# Patient Record
Sex: FEMALE | Race: WHITE | NOT HISPANIC OR LATINO | URBAN - METROPOLITAN AREA
[De-identification: names, ages, dates, MRNs, and addresses within clinical notes are randomized per-mention and may not be internally consistent; named-entity substitution may affect disease eponyms.]

---

## 2017-01-14 ENCOUNTER — OUTPATIENT (OUTPATIENT)
Dept: OUTPATIENT SERVICES | Facility: HOSPITAL | Age: 62
LOS: 1 days | Discharge: HOME | End: 2017-01-14

## 2017-05-27 ENCOUNTER — OUTPATIENT (OUTPATIENT)
Dept: OUTPATIENT SERVICES | Facility: HOSPITAL | Age: 62
LOS: 1 days | Discharge: HOME | End: 2017-05-27

## 2017-06-28 DIAGNOSIS — N18.2 CHRONIC KIDNEY DISEASE, STAGE 2 (MILD): ICD-10-CM

## 2017-06-28 DIAGNOSIS — E11.9 TYPE 2 DIABETES MELLITUS WITHOUT COMPLICATIONS: ICD-10-CM

## 2017-06-28 DIAGNOSIS — E78.00 PURE HYPERCHOLESTEROLEMIA, UNSPECIFIED: ICD-10-CM

## 2017-06-28 DIAGNOSIS — M10.9 GOUT, UNSPECIFIED: ICD-10-CM

## 2017-06-28 DIAGNOSIS — E03.9 HYPOTHYROIDISM, UNSPECIFIED: ICD-10-CM

## 2017-06-28 DIAGNOSIS — E11.39 TYPE 2 DIABETES MELLITUS WITH OTHER DIABETIC OPHTHALMIC COMPLICATION: ICD-10-CM

## 2017-09-02 ENCOUNTER — OUTPATIENT (OUTPATIENT)
Dept: OUTPATIENT SERVICES | Facility: HOSPITAL | Age: 62
LOS: 1 days | Discharge: HOME | End: 2017-09-02

## 2017-09-02 DIAGNOSIS — E78.00 PURE HYPERCHOLESTEROLEMIA, UNSPECIFIED: ICD-10-CM

## 2017-09-02 DIAGNOSIS — N18.2 CHRONIC KIDNEY DISEASE, STAGE 2 (MILD): ICD-10-CM

## 2017-09-02 DIAGNOSIS — E03.9 HYPOTHYROIDISM, UNSPECIFIED: ICD-10-CM

## 2017-09-02 DIAGNOSIS — Z00.00 ENCOUNTER FOR GENERAL ADULT MEDICAL EXAMINATION WITHOUT ABNORMAL FINDINGS: ICD-10-CM

## 2017-09-02 DIAGNOSIS — F17.200 NICOTINE DEPENDENCE, UNSPECIFIED, UNCOMPLICATED: ICD-10-CM

## 2017-09-02 DIAGNOSIS — M10.9 GOUT, UNSPECIFIED: ICD-10-CM

## 2017-09-02 DIAGNOSIS — E11.9 TYPE 2 DIABETES MELLITUS WITHOUT COMPLICATIONS: ICD-10-CM

## 2017-12-09 ENCOUNTER — OUTPATIENT (OUTPATIENT)
Dept: OUTPATIENT SERVICES | Facility: HOSPITAL | Age: 62
LOS: 1 days | Discharge: HOME | End: 2017-12-09

## 2017-12-09 DIAGNOSIS — E11.9 TYPE 2 DIABETES MELLITUS WITHOUT COMPLICATIONS: ICD-10-CM

## 2018-03-03 ENCOUNTER — OUTPATIENT (OUTPATIENT)
Dept: OUTPATIENT SERVICES | Facility: HOSPITAL | Age: 63
LOS: 1 days | Discharge: HOME | End: 2018-03-03

## 2018-03-03 DIAGNOSIS — N18.2 CHRONIC KIDNEY DISEASE, STAGE 2 (MILD): ICD-10-CM

## 2018-03-03 DIAGNOSIS — E78.00 PURE HYPERCHOLESTEROLEMIA, UNSPECIFIED: ICD-10-CM

## 2018-03-03 DIAGNOSIS — E03.9 HYPOTHYROIDISM, UNSPECIFIED: ICD-10-CM

## 2018-03-03 DIAGNOSIS — I10 ESSENTIAL (PRIMARY) HYPERTENSION: ICD-10-CM

## 2018-03-03 DIAGNOSIS — M10.9 GOUT, UNSPECIFIED: ICD-10-CM

## 2018-03-03 DIAGNOSIS — E11.9 TYPE 2 DIABETES MELLITUS WITHOUT COMPLICATIONS: ICD-10-CM

## 2018-06-26 ENCOUNTER — OUTPATIENT (OUTPATIENT)
Dept: OUTPATIENT SERVICES | Facility: HOSPITAL | Age: 63
LOS: 1 days | Discharge: HOME | End: 2018-06-26

## 2018-06-26 DIAGNOSIS — N39.0 URINARY TRACT INFECTION, SITE NOT SPECIFIED: ICD-10-CM

## 2020-06-16 ENCOUNTER — INPATIENT (INPATIENT)
Facility: HOSPITAL | Age: 65
LOS: 0 days | Discharge: LEFT BEFORE DISCHARGE | End: 2020-06-16
Attending: INTERNAL MEDICINE | Admitting: INTERNAL MEDICINE
Payer: MEDICARE

## 2020-06-16 VITALS
HEART RATE: 63 BPM | TEMPERATURE: 96 F | DIASTOLIC BLOOD PRESSURE: 71 MMHG | SYSTOLIC BLOOD PRESSURE: 154 MMHG | RESPIRATION RATE: 16 BRPM

## 2020-06-16 VITALS
SYSTOLIC BLOOD PRESSURE: 149 MMHG | DIASTOLIC BLOOD PRESSURE: 65 MMHG | OXYGEN SATURATION: 91 % | RESPIRATION RATE: 18 BRPM | HEART RATE: 64 BPM | WEIGHT: 173.94 LBS

## 2020-06-16 DIAGNOSIS — Z71.6 TOBACCO ABUSE COUNSELING: ICD-10-CM

## 2020-06-16 DIAGNOSIS — E11.649 TYPE 2 DIABETES MELLITUS WITH HYPOGLYCEMIA WITHOUT COMA: ICD-10-CM

## 2020-06-16 DIAGNOSIS — D64.9 ANEMIA, UNSPECIFIED: ICD-10-CM

## 2020-06-16 DIAGNOSIS — J44.9 CHRONIC OBSTRUCTIVE PULMONARY DISEASE, UNSPECIFIED: ICD-10-CM

## 2020-06-16 DIAGNOSIS — Z79.899 OTHER LONG TERM (CURRENT) DRUG THERAPY: ICD-10-CM

## 2020-06-16 DIAGNOSIS — I50.9 HEART FAILURE, UNSPECIFIED: ICD-10-CM

## 2020-06-16 DIAGNOSIS — N17.9 ACUTE KIDNEY FAILURE, UNSPECIFIED: ICD-10-CM

## 2020-06-16 DIAGNOSIS — Z90.49 ACQUIRED ABSENCE OF OTHER SPECIFIED PARTS OF DIGESTIVE TRACT: Chronic | ICD-10-CM

## 2020-06-16 DIAGNOSIS — I10 ESSENTIAL (PRIMARY) HYPERTENSION: ICD-10-CM

## 2020-06-16 LAB
ALBUMIN SERPL ELPH-MCNC: 3.6 G/DL — SIGNIFICANT CHANGE UP (ref 3.5–5.2)
ALBUMIN SERPL ELPH-MCNC: 3.8 G/DL — SIGNIFICANT CHANGE UP (ref 3.5–5.2)
ALP SERPL-CCNC: 142 U/L — HIGH (ref 30–115)
ALP SERPL-CCNC: 142 U/L — HIGH (ref 30–115)
ALT FLD-CCNC: 11 U/L — SIGNIFICANT CHANGE UP (ref 0–41)
ALT FLD-CCNC: 8 U/L — SIGNIFICANT CHANGE UP (ref 0–41)
ANION GAP SERPL CALC-SCNC: 12 MMOL/L — SIGNIFICANT CHANGE UP (ref 7–14)
ANION GAP SERPL CALC-SCNC: 8 MMOL/L — SIGNIFICANT CHANGE UP (ref 7–14)
APPEARANCE UR: CLEAR — SIGNIFICANT CHANGE UP
AST SERPL-CCNC: 17 U/L — SIGNIFICANT CHANGE UP (ref 0–41)
AST SERPL-CCNC: 18 U/L — SIGNIFICANT CHANGE UP (ref 0–41)
BACTERIA # UR AUTO: ABNORMAL
BASE EXCESS BLDV CALC-SCNC: -9.6 MMOL/L — LOW (ref -2–2)
BILIRUB SERPL-MCNC: 0.2 MG/DL — SIGNIFICANT CHANGE UP (ref 0.2–1.2)
BILIRUB SERPL-MCNC: <0.2 MG/DL — SIGNIFICANT CHANGE UP (ref 0.2–1.2)
BILIRUB UR-MCNC: NEGATIVE — SIGNIFICANT CHANGE UP
BUN SERPL-MCNC: 80 MG/DL — CRITICAL HIGH (ref 10–20)
BUN SERPL-MCNC: 84 MG/DL — CRITICAL HIGH (ref 10–20)
CA-I SERPL-SCNC: 1.23 MMOL/L — SIGNIFICANT CHANGE UP (ref 1.12–1.3)
CALCIUM SERPL-MCNC: 8.6 MG/DL — SIGNIFICANT CHANGE UP (ref 8.5–10.1)
CALCIUM SERPL-MCNC: 8.6 MG/DL — SIGNIFICANT CHANGE UP (ref 8.5–10.1)
CHLORIDE SERPL-SCNC: 102 MMOL/L — SIGNIFICANT CHANGE UP (ref 98–110)
CHLORIDE SERPL-SCNC: 107 MMOL/L — SIGNIFICANT CHANGE UP (ref 98–110)
CK MB CFR SERPL CALC: 6.8 NG/ML — HIGH (ref 0.6–6.3)
CK MB CFR SERPL CALC: 6.9 NG/ML — HIGH (ref 0.6–6.3)
CK SERPL-CCNC: 64 U/L — SIGNIFICANT CHANGE UP (ref 0–225)
CK SERPL-CCNC: 69 U/L — SIGNIFICANT CHANGE UP (ref 0–225)
CO2 SERPL-SCNC: 19 MMOL/L — SIGNIFICANT CHANGE UP (ref 17–32)
CO2 SERPL-SCNC: 19 MMOL/L — SIGNIFICANT CHANGE UP (ref 17–32)
COLOR SPEC: YELLOW — SIGNIFICANT CHANGE UP
CREAT SERPL-MCNC: 2.8 MG/DL — HIGH (ref 0.7–1.5)
CREAT SERPL-MCNC: 2.8 MG/DL — HIGH (ref 0.7–1.5)
DIFF PNL FLD: NEGATIVE — SIGNIFICANT CHANGE UP
EPI CELLS # UR: ABNORMAL /HPF
GAS PNL BLDV: 140 MMOL/L — SIGNIFICANT CHANGE UP (ref 136–145)
GAS PNL BLDV: SIGNIFICANT CHANGE UP
GLUCOSE BLDC GLUCOMTR-MCNC: 103 MG/DL — HIGH (ref 70–99)
GLUCOSE BLDC GLUCOMTR-MCNC: 108 MG/DL — HIGH (ref 70–99)
GLUCOSE BLDC GLUCOMTR-MCNC: 134 MG/DL — HIGH (ref 70–99)
GLUCOSE SERPL-MCNC: 104 MG/DL — HIGH (ref 70–99)
GLUCOSE SERPL-MCNC: 30 MG/DL — CRITICAL LOW (ref 70–99)
GLUCOSE UR QL: NEGATIVE MG/DL — SIGNIFICANT CHANGE UP
HCO3 BLDV-SCNC: 19 MMOL/L — LOW (ref 22–29)
HCT VFR BLD CALC: 31.4 % — LOW (ref 37–47)
HCT VFR BLD CALC: 34.1 % — LOW (ref 37–47)
HCT VFR BLDA CALC: 30.5 % — LOW (ref 34–44)
HGB BLD CALC-MCNC: 10 G/DL — LOW (ref 14–18)
HGB BLD-MCNC: 10.1 G/DL — LOW (ref 12–16)
HGB BLD-MCNC: 9.4 G/DL — LOW (ref 12–16)
HOROWITZ INDEX BLDV+IHG-RTO: 21 — SIGNIFICANT CHANGE UP
KETONES UR-MCNC: NEGATIVE — SIGNIFICANT CHANGE UP
LACTATE BLDV-MCNC: 0.8 MMOL/L — SIGNIFICANT CHANGE UP (ref 0.5–1.6)
LEUKOCYTE ESTERASE UR-ACNC: ABNORMAL
MAGNESIUM SERPL-MCNC: 2.5 MG/DL — HIGH (ref 1.8–2.4)
MCHC RBC-ENTMCNC: 29.6 G/DL — LOW (ref 32–37)
MCHC RBC-ENTMCNC: 29.9 G/DL — LOW (ref 32–37)
MCHC RBC-ENTMCNC: 30 PG — SIGNIFICANT CHANGE UP (ref 27–31)
MCHC RBC-ENTMCNC: 30.4 PG — SIGNIFICANT CHANGE UP (ref 27–31)
MCV RBC AUTO: 100.3 FL — HIGH (ref 81–99)
MCV RBC AUTO: 102.7 FL — HIGH (ref 81–99)
NITRITE UR-MCNC: NEGATIVE — SIGNIFICANT CHANGE UP
NRBC # BLD: 0 /100 WBCS — SIGNIFICANT CHANGE UP (ref 0–0)
NRBC # BLD: 0 /100 WBCS — SIGNIFICANT CHANGE UP (ref 0–0)
NT-PROBNP SERPL-SCNC: 4550 PG/ML — HIGH (ref 0–300)
PCO2 BLDV: 55 MMHG — HIGH (ref 41–51)
PH BLDV: 7.15 — LOW (ref 7.26–7.43)
PH UR: 5.5 — SIGNIFICANT CHANGE UP (ref 5–8)
PHOSPHATE SERPL-MCNC: 8.5 MG/DL — HIGH (ref 2.1–4.9)
PLATELET # BLD AUTO: 100 K/UL — LOW (ref 130–400)
PLATELET # BLD AUTO: 95 K/UL — LOW (ref 130–400)
PO2 BLDV: 59 MMHG — HIGH (ref 20–40)
POTASSIUM BLDV-SCNC: 4.2 MMOL/L — SIGNIFICANT CHANGE UP (ref 3.3–5.6)
POTASSIUM SERPL-MCNC: 4.4 MMOL/L — SIGNIFICANT CHANGE UP (ref 3.5–5)
POTASSIUM SERPL-MCNC: 5.6 MMOL/L — HIGH (ref 3.5–5)
POTASSIUM SERPL-SCNC: 4.4 MMOL/L — SIGNIFICANT CHANGE UP (ref 3.5–5)
POTASSIUM SERPL-SCNC: 5.6 MMOL/L — HIGH (ref 3.5–5)
PROT SERPL-MCNC: 6.6 G/DL — SIGNIFICANT CHANGE UP (ref 6–8)
PROT SERPL-MCNC: 6.6 G/DL — SIGNIFICANT CHANGE UP (ref 6–8)
PROT UR-MCNC: 30 MG/DL
RBC # BLD: 3.13 M/UL — LOW (ref 4.2–5.4)
RBC # BLD: 3.32 M/UL — LOW (ref 4.2–5.4)
RBC # FLD: 16.7 % — HIGH (ref 11.5–14.5)
RBC # FLD: 16.8 % — HIGH (ref 11.5–14.5)
SAO2 % BLDV: 90 % — SIGNIFICANT CHANGE UP
SARS-COV-2 RNA SPEC QL NAA+PROBE: SIGNIFICANT CHANGE UP
SODIUM SERPL-SCNC: 133 MMOL/L — LOW (ref 135–146)
SODIUM SERPL-SCNC: 134 MMOL/L — LOW (ref 135–146)
SP GR SPEC: 1.02 — SIGNIFICANT CHANGE UP (ref 1.01–1.03)
TROPONIN T SERPL-MCNC: 0.01 NG/ML — SIGNIFICANT CHANGE UP
TROPONIN T SERPL-MCNC: 0.01 NG/ML — SIGNIFICANT CHANGE UP
TROPONIN T SERPL-MCNC: 0.02 NG/ML — HIGH
UROBILINOGEN FLD QL: 0.2 MG/DL — SIGNIFICANT CHANGE UP (ref 0.2–0.2)
WBC # BLD: 7.48 K/UL — SIGNIFICANT CHANGE UP (ref 4.8–10.8)
WBC # BLD: 9.24 K/UL — SIGNIFICANT CHANGE UP (ref 4.8–10.8)
WBC # FLD AUTO: 7.48 K/UL — SIGNIFICANT CHANGE UP (ref 4.8–10.8)
WBC # FLD AUTO: 9.24 K/UL — SIGNIFICANT CHANGE UP (ref 4.8–10.8)
WBC UR QL: SIGNIFICANT CHANGE UP /HPF

## 2020-06-16 PROCEDURE — 99223 1ST HOSP IP/OBS HIGH 75: CPT

## 2020-06-16 PROCEDURE — 99291 CRITICAL CARE FIRST HOUR: CPT

## 2020-06-16 PROCEDURE — 76770 US EXAM ABDO BACK WALL COMP: CPT | Mod: 26

## 2020-06-16 PROCEDURE — 99053 MED SERV 10PM-8AM 24 HR FAC: CPT

## 2020-06-16 PROCEDURE — 71045 X-RAY EXAM CHEST 1 VIEW: CPT | Mod: 26

## 2020-06-16 RX ORDER — DEXTROSE 50 % IN WATER 50 %
25 SYRINGE (ML) INTRAVENOUS ONCE
Refills: 0 | Status: DISCONTINUED | OUTPATIENT
Start: 2020-06-16 | End: 2020-06-16

## 2020-06-16 RX ORDER — BUDESONIDE AND FORMOTEROL FUMARATE DIHYDRATE 160; 4.5 UG/1; UG/1
2 AEROSOL RESPIRATORY (INHALATION)
Refills: 0 | Status: DISCONTINUED | OUTPATIENT
Start: 2020-06-16 | End: 2020-06-16

## 2020-06-16 RX ORDER — FUROSEMIDE 40 MG
40 TABLET ORAL DAILY
Refills: 0 | Status: DISCONTINUED | OUTPATIENT
Start: 2020-06-16 | End: 2020-06-16

## 2020-06-16 RX ORDER — ATORVASTATIN CALCIUM 80 MG/1
10 TABLET, FILM COATED ORAL AT BEDTIME
Refills: 0 | Status: DISCONTINUED | OUTPATIENT
Start: 2020-06-16 | End: 2020-06-16

## 2020-06-16 RX ORDER — FUROSEMIDE 40 MG
40 TABLET ORAL ONCE
Refills: 0 | Status: COMPLETED | OUTPATIENT
Start: 2020-06-16 | End: 2020-06-16

## 2020-06-16 RX ORDER — GLUCAGON INJECTION, SOLUTION 0.5 MG/.1ML
1 INJECTION, SOLUTION SUBCUTANEOUS ONCE
Refills: 0 | Status: DISCONTINUED | OUTPATIENT
Start: 2020-06-16 | End: 2020-06-16

## 2020-06-16 RX ORDER — NICOTINE POLACRILEX 2 MG
1 GUM BUCCAL DAILY
Refills: 0 | Status: DISCONTINUED | OUTPATIENT
Start: 2020-06-16 | End: 2020-06-16

## 2020-06-16 RX ORDER — INSULIN LISPRO 100/ML
VIAL (ML) SUBCUTANEOUS AT BEDTIME
Refills: 0 | Status: DISCONTINUED | OUTPATIENT
Start: 2020-06-16 | End: 2020-06-16

## 2020-06-16 RX ORDER — FENOFIBRATE,MICRONIZED 130 MG
145 CAPSULE ORAL AT BEDTIME
Refills: 0 | Status: DISCONTINUED | OUTPATIENT
Start: 2020-06-16 | End: 2020-06-16

## 2020-06-16 RX ORDER — DEXTROSE 50 % IN WATER 50 %
12.5 SYRINGE (ML) INTRAVENOUS ONCE
Refills: 0 | Status: DISCONTINUED | OUTPATIENT
Start: 2020-06-16 | End: 2020-06-16

## 2020-06-16 RX ORDER — INSULIN LISPRO 100/ML
VIAL (ML) SUBCUTANEOUS
Refills: 0 | Status: DISCONTINUED | OUTPATIENT
Start: 2020-06-16 | End: 2020-06-16

## 2020-06-16 RX ORDER — DEXTROSE 50 % IN WATER 50 %
50 SYRINGE (ML) INTRAVENOUS ONCE
Refills: 0 | Status: COMPLETED | OUTPATIENT
Start: 2020-06-16 | End: 2020-06-16

## 2020-06-16 RX ORDER — IPRATROPIUM/ALBUTEROL SULFATE 18-103MCG
3 AEROSOL WITH ADAPTER (GRAM) INHALATION EVERY 6 HOURS
Refills: 0 | Status: DISCONTINUED | OUTPATIENT
Start: 2020-06-16 | End: 2020-06-16

## 2020-06-16 RX ORDER — SODIUM CHLORIDE 9 MG/ML
1000 INJECTION, SOLUTION INTRAVENOUS
Refills: 0 | Status: DISCONTINUED | OUTPATIENT
Start: 2020-06-16 | End: 2020-06-16

## 2020-06-16 RX ORDER — HEPARIN SODIUM 5000 [USP'U]/ML
5000 INJECTION INTRAVENOUS; SUBCUTANEOUS
Refills: 0 | Status: DISCONTINUED | OUTPATIENT
Start: 2020-06-16 | End: 2020-06-16

## 2020-06-16 RX ORDER — FUROSEMIDE 40 MG
20 TABLET ORAL DAILY
Refills: 0 | Status: DISCONTINUED | OUTPATIENT
Start: 2020-06-16 | End: 2020-06-16

## 2020-06-16 RX ORDER — DEXTROSE 50 % IN WATER 50 %
15 SYRINGE (ML) INTRAVENOUS ONCE
Refills: 0 | Status: DISCONTINUED | OUTPATIENT
Start: 2020-06-16 | End: 2020-06-16

## 2020-06-16 RX ORDER — INSULIN GLARGINE 100 [IU]/ML
40 INJECTION, SOLUTION SUBCUTANEOUS
Qty: 0 | Refills: 0 | DISCHARGE

## 2020-06-16 RX ADMIN — Medication 60 MILLIGRAM(S): at 15:39

## 2020-06-16 RX ADMIN — Medication 40 MILLIGRAM(S): at 12:10

## 2020-06-16 RX ADMIN — HEPARIN SODIUM 5000 UNIT(S): 5000 INJECTION INTRAVENOUS; SUBCUTANEOUS at 09:52

## 2020-06-16 RX ADMIN — Medication 50 MILLILITER(S): at 03:41

## 2020-06-16 RX ADMIN — HEPARIN SODIUM 5000 UNIT(S): 5000 INJECTION INTRAVENOUS; SUBCUTANEOUS at 18:09

## 2020-06-16 RX ADMIN — Medication 40 MILLIGRAM(S): at 04:30

## 2020-06-16 NOTE — CONSULT NOTE ADULT - ATTENDING COMMENTS
patient seen and examined agree above note   prednisone 60 mg for 3 days then 40 for 3 days then 20 for 3 days   symbicort Q 12 hrs   levaquine for 7 days   need follow up as outpatient to rpeat cxr to confirm resolution of left opacity   need ct scan outpatient   patient was informed

## 2020-06-16 NOTE — CHART NOTE - NSCHARTNOTEFT_GEN_A_CORE
Nurse called informing patient wants to leave the hospital    Patient seen and examined earlier. Appreciate input from critical care and pulmonology. I evaluated patient again. Patient states she is feeling better. She has plans to move to [place] tomorrow and has to leave.  She understands possibility of worsening condition of her heart, lungs and kidneys and complication including death can happen. Even then patient wants to leave the hospital adamently. nurse witnessed the conversation.    Patient is leaving AMA

## 2020-06-16 NOTE — H&P ADULT - NSICDXPASTMEDICALHX_GEN_ALL_CORE_FT
PAST MEDICAL HISTORY:  Chronic obstructive pulmonary disease     Diabetes mellitus, type 2     High blood cholesterol     History of cholecystectomy     Hypertension

## 2020-06-16 NOTE — ED PROVIDER NOTE - NS ED ROS FT
Constitutional: (-) fever  Eyes/ENT: (-) blurry vision, (-) epistaxis  Cardiovascular: (-) chest pain, (-) syncope  Respiratory: (+) cough, (+) shortness of breath (see HPI)  Gastrointestinal: (-) vomiting, (+) diarrhea (see HPI)  Musculoskeletal: (-) neck pain, (-) back pain, (-) joint pain  Integumentary: (-) rash, (+) edema (see HPI)  Neurological: (-) headache, (-) altered mental status  Psychiatric: (-) hallucinations  Allergic/Immunologic: (-) pruritus

## 2020-06-16 NOTE — ED PROVIDER NOTE - ATTENDING CONTRIBUTION TO CARE
2-3 weeks of MCKENNA, orthopnea, leg swelling associated weakness that worsened today. Takes insulin and took it today at 6-7pm with nml meal. initially found to be hypoglycemic. on exam mild tacphynea, diaphoretic, with crackles at bases, pitting edema. We gave orange juice and dextrose initially, bed side us showed nml rv and grossly nml EF but B lines diffusely. Patient resuscitated with dextrose. imp: heart failure with hypoglycemia. will check labs, perform imaging.

## 2020-06-16 NOTE — ED PROCEDURE NOTE - US CPT CODES
97447 US Chest (PTX, Pleural Effussion/CHF vs COPD)
16621 Echocardiography Transthoracic with Image 2D (Echo/FAST)

## 2020-06-16 NOTE — CONSULT NOTE ADULT - ASSESSMENT
IMPRESSION:  Acute hypercapnic respiratory failure  COPD with exacerbation  Component of CHF  NAGMA - MANISH?    RECOMMENDATIONS:  Albuterol q4 and PRN  Start triple therapy  Prednisone 40mg q24  Levaquin renal dose  Check d dimer  LE duplex  DVT ppx

## 2020-06-16 NOTE — ED ADULT NURSE NOTE - PMH
Chronic obstructive pulmonary disease    Diabetes mellitus, type 2    High blood cholesterol    History of cholecystectomy    Hypertension

## 2020-06-16 NOTE — H&P ADULT - PROBLEM SELECTOR PLAN 7
Pateint doesn't know doses, would review with patient's usual clinicians when possible Patient doesn't know doses, would review with patient's usual clinicians when possible. Also clarify use of Beta blocker

## 2020-06-16 NOTE — DISCHARGE NOTE NURSING/CASE MANAGEMENT/SOCIAL WORK - PATIENT PORTAL LINK FT
You can access the FollowMyHealth Patient Portal offered by [facility] by registering at the following website: [email or web address]. By joining [employer]’s FollowMyHealth portal, you will also be able to view your health information using other applications (apps) compatible with our system.

## 2020-06-16 NOTE — ED PROVIDER NOTE - CARE PLAN
Principal Discharge DX:	Acute congestive heart failure  Secondary Diagnosis:	Hypoglycemia associated with type 2 diabetes mellitus  Secondary Diagnosis:	Acute kidney injury

## 2020-06-16 NOTE — H&P ADULT - NSHPOUTPATIENTPROVIDERS_GEN_ALL_CORE
cardiology- Dr Kelley, PMD- Dr Cooper cardiology- Dr Kelley, PMD- Dr Cooper (does not see pulmonary or nephrology)

## 2020-06-16 NOTE — ED PROVIDER NOTE - PROGRESS NOTE DETAILS
65 female, weakness and SOB x3 weeks, progressively worsening. Initial examination pt cool, pale and diaphoretic. BGL found to be 37 g/dl. Pt given (1) amp D50, pt more alert and skin appearance unremarkable. Pt also found to have initial RA SpO2 91%, placed on NC at 3 LPM, SpO2 improved to 97% Bedside US reveals B lines bilaterally, no pleural effusion noted. Cardiac reveals normal LV fxn, no Rt heart strain and pericardial effusion. Rectal temp 94.7, pt to get additional work up for sepsis. Will revaluate for abx therapy once further lab results available. ICU consulted given MANISH with metabolic and respiratory acidosis with likely mild compensation with breathing. Will give lasix given that patient is total volume overloaded. Will hold off on fluids. repeat blood sugar is nml.

## 2020-06-16 NOTE — CONSULT NOTE ADULT - SUBJECTIVE AND OBJECTIVE BOX
Patient is a 65y old  Female who presents with a chief complaint of     HPI:  64yo female whose PMH includes HTN, HLD, COPD (active smoker, does not have a pulmonologist), DM type 2 on insulin and an unspecified kidney disorder (managed by her PMD, never saw a nephrologist) and (to me) denies any history of CHF, presents to the ER with worsening shortness of breath and weakness over course of 3 weeks. There appears to be orthopnea and PND but patient denies fevers, chills or chest pain. She does have a chronic problem with diarrhea. Last ECHO and stress test was 5 years ago. Tonight she awoke tired and sweaty. On arrival to ER significant findings included a glucose of 30 (given D50 with symptom improvement), bradycardia and apparent acute CHF (clinically, BNP result, on bedside sonogram and on CXR) (2020 05:41)      PAST MEDICAL & SURGICAL HISTORY:  Hypertension  History of cholecystectomy  High blood cholesterol  Diabetes mellitus, type 2  Chronic obstructive pulmonary disease  History of cholecystectomy      SOCIAL HX:   Active smoker    FAMILY HISTORY:  .  No cardiovascular or pulmonary family history     REVIEW OF SYSTEMS:    CONSTITUTIONAL:   +chills  + weight gain   no weight loss    EYES:   no discharge,   no visual changes.    ENT:   Ears: no ear pain and no hearing problems.  Nose: no nasal congestion and no nasal drainage.  Mouth/Throat: no dysphagia,  no hoarseness and no throat pain.  Neck: no lumps, no pain, no stiffness and no swollen glands.    CARDIOVASCULAR:   no chest pain,   no swelling  no palpitaions  no syncope    RESPIRATORY:  SOB, wheezing,  dyspnea on exertion  Clear sputum production    GASTROINTESTINAL:   no abdominal pain,   no constipation,   no diarrhea,   no vomiting.    GENITOURINARY:  no dysuria,   no hematuria.    MUSCULOSKELETAL:   no back pain,   no musculoskeletal pain,  no weakness.    SKIN:   no jaundice,    no rashes.    NEURO:   no loss of consciousness,   no headache,   no weakness.    PSYCHIATRIC:   no known mental health issues  no anxiety  no depression    ALLERGIC/IMMUNOLOGIC:   No active allergic or immunologic issues      Allergies    No Known Allergies      PHYSICAL EXAM  Vital Signs Last 24 Hrs  T(C): 34.6 (2020 05:00), Max: 34.8 (2020 03:51)  T(F): 94.3 (2020 05:00), Max: 94.6 (2020 03:51)  HR: 56 (2020 06:04) (56 - 64)  BP: 145/65 (2020 06:04) (145/65 - 149/65)  BP(mean): --  RR: 18 (2020 06:04) (16 - 18)  SpO2: 96% on 2L NC (2020 06:04) (91% - 100%)    CONSTITUTIONAL:  Well nourished.  NAD    ENT:   Airway patent,   No thrush    EYES:   Clear bilaterally,   pupils equal,   round and reactive to light.    CARDIAC:   Normal rate,   regular rhythm.    no edema      CAROTID:   normal systolic impulse  no bruits  Bilateral lower extremity pitting edema    RESPIRATORY:   Bilateral inspiratory and expiratory wheezing  Normal chest expansion  Not tachypneic,  No use of accessory muscles    GASTROINTESTINAL:  Abdomen soft,   non-tender,   no guarding,   + BS    GENITOURINARY  normal genitalia for sex  no edema    MUSCULOSKELETAL:   range of motion is not limited,  no clubbing, cyanosis    NEUROLOGICAL:   Alert and oriented   no motor deficits.  pertinent DTRs normal    SKIN:   Skin normal color for race,   No evidence of rash.    PSYCHIATRIC:   normal mood and affect.   no apparent risk to self or others.    HEME LYMPH:   No cervical  lymphadenopathy.  no inguinal lymphadenopathy          LABS:                          9.4    9.24  )-----------( 100      ( 2020 03:45 )             31.4                                               06-16    133<L>  |  102  |  80<HH>  ----------------------------<  30<LL>  4.4   |  19  |  2.8<H>    Ca    8.6      2020 03:45  Mg     2.5     06-16    TPro  6.6  /  Alb  3.6  /  TBili  0.2  /  DBili  x   /  AST  17  /  ALT  8   /  AlkPhos  142<H>  06-16                                             Urinalysis Basic - ( 2020 04:25 )    Color: Yellow / Appearance: Clear / S.020 / pH: x  Gluc: x / Ketone: Negative  / Bili: Negative / Urobili: 0.2 mg/dL   Blood: x / Protein: 30 mg/dL / Nitrite: Negative   Leuk Esterase: Trace / RBC: x / WBC 3-5 /HPF   Sq Epi: x / Non Sq Epi: Moderate /HPF / Bacteria: Few        CARDIAC MARKERS ( 2020 03:45 )  x     / 0.01 ng/mL / x     / x     / x                                                LIVER FUNCTIONS - ( 2020 03:45 )  Alb: 3.6 g/dL / Pro: 6.6 g/dL / ALK PHOS: 142 U/L / ALT: 8 U/L / AST: 17 U/L / GGT: x                                                                                                MEDICATIONS  (STANDING):  atorvastatin 10 milliGRAM(s) Oral at bedtime  furosemide   Injectable 20 milliGRAM(s) IV Push daily  heparin   Injectable 5000 Unit(s) SubCutaneous two times a day    MEDICATIONS  (PRN):  albuterol/ipratropium for Nebulization 3 milliLiter(s) Nebulizer every 6 hours PRN Shortness of Breath and/or Wheezing

## 2020-06-16 NOTE — CONSULT NOTE ADULT - ASSESSMENT
chronic kidney disease stage 4 with chronic prerenal azotemic picture   - baseline Cr in 2's with eGFR 25%   - non-nephrotic proteinuria  possible mild prerenal MANISH from CHF / diuretics (CRS) vs progression of CKD  acute CHF  COPD exacerbation  DM2 on insulin with symptomatic hypoglycemia    plan:    increase lasix 40mg iv q24H for 24h  may allow for some worsening permissive azotemia to achieve better volume status.  Expect BUN to also worsen if steroids given  monitor renal function and electrolytes with diuresis  monitor UOP and cont cortes for now  no ACE-I / ARB  check renal sono  check echo  check PO4, PTH, SPEP, light chain assay  check U protein / Cr  f/u pulm recommendations  hold insulin, check BS  hold BB due to bradycardia  pt was planning on moving to [facility] this week

## 2020-06-16 NOTE — H&P ADULT - PROBLEM SELECTOR PLAN 2
Will ask renal to see Appears to have a chronic renal condition, need to clarify with patient's usual clinicians but will ask renal to see

## 2020-06-16 NOTE — ED PROVIDER NOTE - PHYSICAL EXAMINATION
Physical Exam    Vital Signs: I have reviewed the initial vital signs.  Constitutional: well-nourished, appears stated age, no acute distress  Eyes: Conjunctiva pink, Sclera clear, PERRLA, EOMI.  Cardiovascular: S1 and S2, regular rate, regular rhythm, well-perfused extremities, radial pulses equal and 2+  Respiratory: labored respiratory effort, auscultation bilaterally reveals no wheezing however rales and rhonchi noted in all fields   Gastrointestinal: soft, non-tender abdomen, no pulsatile mass, normal bowl sounds  Musculoskeletal: supple neck, lower extremity edema 3+ b/l, no midline tenderness  Integumentary: cool, pale, diaphoretic, no rashes noted   Neurologic: awake, alert, cranial nerves II-XII grossly intact, extremities’ motor and sensory functions grossly intact  Psychiatric: appropriate mood, appropriate affect

## 2020-06-16 NOTE — ED PROVIDER NOTE - CLINICAL SUMMARY MEDICAL DECISION MAKING FREE TEXT BOX
patient was evaluated for sob and hypoglycemia, she required dextrose for resuscitation, she was found to be in heart failure and was treated with diuretics. ICU was consulted given low pH however her respiratory status is stable with supplmental 02 and does not require ICU level of care at this moment thus will be admitted for telemetry  monitoring.

## 2020-06-16 NOTE — H&P ADULT - HISTORY OF PRESENT ILLNESS
66yo female whose PMH includes HTN, HLD, COPD (still smokes), DM type 2 on insulin and an unspecified kidney disorder (managed by her PMD, never saw a nephrologist) and (to me) denies any history of CHF, presents to the ER with worsening shortness of breath and weakness over course of 3 weeks. There appears to be orthopnea and PND but patient denies fevers, chills or chest pain. She does have a chronic problem with diarrhea. Last ECHO and stress test was 5 years ago. On arrival to ER significant findings included a glucose of 30 and apparent acute CHF (clinically, BNP result, on bedside sonogram and on CXR 64yo female whose PMH includes HTN, HLD, COPD (still smokes), DM type 2 on insulin and an unspecified kidney disorder (managed by her PMD, never saw a nephrologist) and (to me) denies any history of CHF, presents to the ER with worsening shortness of breath and weakness over course of 3 weeks. There appears to be orthopnea and PND but patient denies fevers, chills or chest pain. She does have a chronic problem with diarrhea. Last ECHO and stress test was 5 years ago. Tonight she awoke tired and sweaty. On arrival to ER significant findings included a glucose of 30, bradycardia and apparent acute CHF (clinically, BNP result, on bedside sonogram and on CXR) 66yo female whose PMH includes HTN, HLD, COPD (still smokes), DM type 2 on insulin and an unspecified kidney disorder (managed by her PMD, never saw a nephrologist) and (to me) denies any history of CHF, presents to the ER with worsening shortness of breath and weakness over course of 3 weeks. There appears to be orthopnea and PND but patient denies fevers, chills or chest pain. She does have a chronic problem with diarrhea. Last ECHO and stress test was 5 years ago. Tonight she awoke tired and sweaty. On arrival to ER significant findings included a glucose of 30 (given D50 with symptom improvement), bradycardia and apparent acute CHF (clinically, BNP result, on bedside sonogram and on CXR)

## 2020-06-16 NOTE — H&P ADULT - NSHPLABSRESULTS_GEN_ALL_CORE
9.4    9.24  )-----------( 100      ( 2020 03:45 )             31.4     06-    133<L>  |  102  |  80<HH>  ----------------------------<  30<LL>  4.4   |  19  |  2.8<H>    Ca    8.6      2020 03:45  Mg     2.5         TPro  6.6  /  Alb  3.6  /  TBili  0.2  /  DBili  x   /  AST  17  /  ALT  8   /  AlkPhos  142<H>            Urinalysis Basic - ( 2020 04:25 )    Color: Yellow / Appearance: Clear / S.020 / pH: x  Gluc: x / Ketone: Negative  / Bili: Negative / Urobili: 0.2 mg/dL   Blood: x / Protein: 30 mg/dL / Nitrite: Negative   Leuk Esterase: Trace / RBC: x / WBC 3-5 /HPF   Sq Epi: x / Non Sq Epi: Moderate /HPF / Bacteria: Few        Lactate Trend    CARDIAC MARKERS ( 2020 03:45 )  x     / 0.01 ng/mL / x     / x     / x          CAPILLARY BLOOD GLUCOSE      POCT Blood Glucose.: 126 mg/dL (2020 06:10) 9.4    9.24  )-----------( 100      ( 2020 03:45 )             31.4     06-16    133<L>  |  102  |  80<HH>  ----------------------------<  30<LL>  4.4   |  19  |  2.8<H>    Ca    8.6      2020 03:45  Mg     2.5     -    TPro  6.6  /  Alb  3.6  /  TBili  0.2  /  DBili  x   /  AST  17  /  ALT  8   /  AlkPhos  142<H>  -        BNP- 4550     Urinalysis Basic - ( 2020 04:25 )    Color: Yellow / Appearance: Clear / S.020 / pH: x  Gluc: x / Ketone: Negative  / Bili: Negative / Urobili: 0.2 mg/dL   Blood: x / Protein: 30 mg/dL / Nitrite: Negative   Leuk Esterase: Trace / RBC: x / WBC 3-5 /HPF   Sq Epi: x / Non Sq Epi: Moderate /HPF / Bacteria: Few        Lactate Trend    CARDIAC MARKERS ( 2020 03:45 )  x     / 0.01 ng/mL / x     / x     / x          CAPILLARY BLOOD GLUCOSE      POCT Blood Glucose.: 126 mg/dL (2020 06:10)    CXR to me is mild cardiomegaly, possible mild fluid congestion

## 2020-06-16 NOTE — H&P ADULT - PROBLEM SELECTOR PLAN 1
(I suspect based on medications that patient has chronic diastolic CHF now with acute exacerbation) For now continue lasix as IVP, check cardiac enzymes, order ECHO. Consider contact with patient's usual cardiologist for backround (I suspect based on medications that patient has chronic diastolic CHF now with acute exacerbation) For now continue Lasix as IVP, check cardiac enzymes, order ECHO. Consider contact with patient's usual cardiologist for background (Although patient denies any history of CHF, I suspect based on medications that patient has chronic diastolic CHF now with acute exacerbation) For now continue Lasix as IVP, check cardiac enzymes, order ECHO. Consider contact with patient's usual cardiologist for background

## 2020-06-16 NOTE — CONSULT NOTE ADULT - ASSESSMENT
1. Hypoglycemia due to insulin.  2. DM , type 2.  3. Bradycardia due to Atenolol. In NSR and sometimes sinus bradycardia in 40s.  4. COPD due to chronic smoking.  5. Mild CHF.      Recommendations -  1. Hold B blockers.  2. nc o2 at 2 L/M. Keep O2 sat 90-94%.  3. Adjust her insulin dosage.  4. Smoking ceasation.  5. Admit on telemetry.

## 2020-06-16 NOTE — ED PROVIDER NOTE - OBJECTIVE STATEMENT
65 year old female with PMH HTN, HLD, COPD, DM presents to ED c/o SOB and weakness. Pt states both symptoms have been occurring for approx. 3 weeks, and have become progressively worse. Pt states weakness is generalized and is worse tonight. Pt attests to orthopnea (sleeps sitting up) and PND. Last echo and stress test approx. 5 years ago, unknown results. Pt denies fever, chills, bodyaches, chest pain, abdominal pain, NVC. Pt does attest to diarrhea however this is pt norm.

## 2020-06-16 NOTE — CONSULT NOTE ADULT - SUBJECTIVE AND OBJECTIVE BOX
Patient is a 65y old  Female who presents with a chief complaint of fatigue and sweating.    HPI: Patient is a known case of DM -on insulin and chronic smoker. She woke up after midnight and felt very tired and was sweating. Her glucose was found to be low. She was brought to ER. Denies fever , chest pain or vomiting. No cough.        PAST MEDICAL & SURGICAL HISTORY:  Hypertension  History of cholecystectomy  High blood cholesterol  Diabetes mellitus, type 2  Chronic obstructive pulmonary disease  History of cholecystectomy        MEDICATIONS  (STANDING):  heparin   Injectable 5000 Unit(s) SubCutaneous two times a day    Allergies    No Known Allergies    Intolerances      Vital Signs Last 24 Hrs  T(C): 34.6 (2020 05:00), Max: 34.8 (2020 03:51)  T(F): 94.3 (2020 05:00), Max: 94.6 (2020 03:51)  HR: 58 (2020 05:00) (58 - 64)  BP: 145/67 (2020 04:20) (145/67 - 149/65)  BP(mean): --  RR: 18 (2020 05:00) (16 - 18)  SpO2: 100% (2020 05:00) (91% - 100%)  PHYSICAL EXAM:      Constitutional: fair.    Eyes: no icterus. pink conjunctivae.    ENMT: normal.    Neck: no swelling.    Breasts: deferred.    Back:    Respiratory: few crepitations at lower 1/3. no wheezing.    Cardiovascular: regular. no murmur.    Gastrointestinal: soft , non tender.    Genitourinary:        Extremities: mild lower leg edema.    Vascular: pulses felt, suni.    Neurological: fully alert. speech normal and no focal deficit.    Skin: no rash.    Lymph Nodes: none    Musculoskeletal: no tenderness    Psychiatric:          133<L>  |  102  |  80<HH>  ----------------------------<  30<LL>  4.4   |  19  |  2.8<H>    Ca    8.6      2020 03:45  Mg     2.5         TPro  6.6  /  Alb  3.6  /  TBili  0.2  /  DBili  x   /  AST  17  /  ALT  8   /  AlkPhos  142<H>      Urinalysis Basic - ( 2020 04:25 )    Color: Yellow / Appearance: Clear / S.020 / pH: x  Gluc: x / Ketone: Negative  / Bili: Negative / Urobili: 0.2 mg/dL   Blood: x / Protein: 30 mg/dL / Nitrite: Negative   Leuk Esterase: Trace / RBC: x / WBC 3-5 /HPF   Sq Epi: x / Non Sq Epi: Moderate /HPF / Bacteria: Few      CBC Full  -  ( 2020 03:45 )  WBC Count : 9.24 K/uL  RBC Count : 3.13 M/uL  Hemoglobin : 9.4 g/dL  Hematocrit : 31.4 %  Platelet Count - Automated : 100 K/uL  Mean Cell Volume : 100.3 fL  Mean Cell Hemoglobin : 30.0 pg  Mean Cell Hemoglobin Concentration : 29.9 g/dL  Auto Neutrophil # : x  Auto Lymphocyte # : x  Auto Monocyte # : x  Auto Eosinophil # : x  Auto Basophil # : x  Auto Neutrophil % : x  Auto Lymphocyte % : x  Auto Monocyte % : x  Auto Eosinophil % : x  Auto Basophil % : x          EKG NSR , no ST T changes.    Radiology: cxray- few prominent vascular markings in lower zones.

## 2020-06-17 LAB
CALCIUM SERPL-MCNC: 8.5 MG/DL — SIGNIFICANT CHANGE UP (ref 8.4–10.5)
CULTURE RESULTS: NO GROWTH — SIGNIFICANT CHANGE UP
PROT SERPL-MCNC: 6.7 G/DL — SIGNIFICANT CHANGE UP (ref 6–8.3)
PROT SERPL-MCNC: 6.7 G/DL — SIGNIFICANT CHANGE UP (ref 6–8.3)
PTH-INTACT FLD-MCNC: 146 PG/ML — HIGH (ref 15–65)
SPECIMEN SOURCE: SIGNIFICANT CHANGE UP

## 2020-06-18 DIAGNOSIS — J96.02 ACUTE RESPIRATORY FAILURE WITH HYPERCAPNIA: ICD-10-CM

## 2020-06-18 DIAGNOSIS — D64.9 ANEMIA, UNSPECIFIED: ICD-10-CM

## 2020-06-18 DIAGNOSIS — J44.1 CHRONIC OBSTRUCTIVE PULMONARY DISEASE WITH (ACUTE) EXACERBATION: ICD-10-CM

## 2020-06-18 DIAGNOSIS — T38.3X5A ADVERSE EFFECT OF INSULIN AND ORAL HYPOGLYCEMIC [ANTIDIABETIC] DRUGS, INITIAL ENCOUNTER: ICD-10-CM

## 2020-06-18 DIAGNOSIS — N17.9 ACUTE KIDNEY FAILURE, UNSPECIFIED: ICD-10-CM

## 2020-06-18 DIAGNOSIS — F17.210 NICOTINE DEPENDENCE, CIGARETTES, UNCOMPLICATED: ICD-10-CM

## 2020-06-18 DIAGNOSIS — Z90.49 ACQUIRED ABSENCE OF OTHER SPECIFIED PARTS OF DIGESTIVE TRACT: ICD-10-CM

## 2020-06-18 DIAGNOSIS — R00.1 BRADYCARDIA, UNSPECIFIED: ICD-10-CM

## 2020-06-18 DIAGNOSIS — Z79.4 LONG TERM (CURRENT) USE OF INSULIN: ICD-10-CM

## 2020-06-18 DIAGNOSIS — R06.02 SHORTNESS OF BREATH: ICD-10-CM

## 2020-06-18 DIAGNOSIS — I13.0 HYPERTENSIVE HEART AND CHRONIC KIDNEY DISEASE WITH HEART FAILURE AND STAGE 1 THROUGH STAGE 4 CHRONIC KIDNEY DISEASE, OR UNSPECIFIED CHRONIC KIDNEY DISEASE: ICD-10-CM

## 2020-06-18 DIAGNOSIS — I50.33 ACUTE ON CHRONIC DIASTOLIC (CONGESTIVE) HEART FAILURE: ICD-10-CM

## 2020-06-18 DIAGNOSIS — E78.5 HYPERLIPIDEMIA, UNSPECIFIED: ICD-10-CM

## 2020-06-18 DIAGNOSIS — E11.649 TYPE 2 DIABETES MELLITUS WITH HYPOGLYCEMIA WITHOUT COMA: ICD-10-CM

## 2020-06-18 DIAGNOSIS — N18.4 CHRONIC KIDNEY DISEASE, STAGE 4 (SEVERE): ICD-10-CM

## 2020-06-18 DIAGNOSIS — E11.22 TYPE 2 DIABETES MELLITUS WITH DIABETIC CHRONIC KIDNEY DISEASE: ICD-10-CM

## 2020-06-18 LAB
% ALBUMIN: 47.6 % — SIGNIFICANT CHANGE UP
% ALPHA 1: 7.1 % — SIGNIFICANT CHANGE UP
% ALPHA 2: 12.8 % — SIGNIFICANT CHANGE UP
% BETA: 15.4 % — SIGNIFICANT CHANGE UP
% GAMMA: 17.1 % — SIGNIFICANT CHANGE UP
ALBUMIN SERPL ELPH-MCNC: 3.2 G/DL — LOW (ref 3.6–5.5)
ALBUMIN/GLOB SERPL ELPH: 0.9 RATIO — SIGNIFICANT CHANGE UP
ALPHA1 GLOB SERPL ELPH-MCNC: 0.5 G/DL — HIGH (ref 0.1–0.4)
ALPHA2 GLOB SERPL ELPH-MCNC: 0.9 G/DL — SIGNIFICANT CHANGE UP (ref 0.5–1)
B-GLOBULIN SERPL ELPH-MCNC: 1 G/DL — SIGNIFICANT CHANGE UP (ref 0.5–1)
GAMMA GLOBULIN: 1.1 G/DL — SIGNIFICANT CHANGE UP (ref 0.6–1.6)
PROT PATTERN SERPL ELPH-IMP: SIGNIFICANT CHANGE UP

## 2020-06-21 LAB
CULTURE RESULTS: SIGNIFICANT CHANGE UP
CULTURE RESULTS: SIGNIFICANT CHANGE UP
SPECIMEN SOURCE: SIGNIFICANT CHANGE UP
SPECIMEN SOURCE: SIGNIFICANT CHANGE UP

## 2020-06-28 ENCOUNTER — INPATIENT (INPATIENT)
Facility: HOSPITAL | Age: 65
LOS: 2 days | Discharge: HOME | End: 2020-07-01
Attending: HOSPITALIST | Admitting: HOSPITALIST
Payer: MEDICARE

## 2020-06-28 VITALS
WEIGHT: 179.9 LBS | DIASTOLIC BLOOD PRESSURE: 75 MMHG | TEMPERATURE: 99 F | SYSTOLIC BLOOD PRESSURE: 182 MMHG | HEART RATE: 83 BPM | OXYGEN SATURATION: 96 % | HEIGHT: 64 IN | RESPIRATION RATE: 18 BRPM

## 2020-06-28 DIAGNOSIS — Z90.49 ACQUIRED ABSENCE OF OTHER SPECIFIED PARTS OF DIGESTIVE TRACT: Chronic | ICD-10-CM

## 2020-06-28 DIAGNOSIS — R79.89 OTHER SPECIFIED ABNORMAL FINDINGS OF BLOOD CHEMISTRY: ICD-10-CM

## 2020-06-28 DIAGNOSIS — I50.9 HEART FAILURE, UNSPECIFIED: ICD-10-CM

## 2020-06-28 DIAGNOSIS — E11.69 TYPE 2 DIABETES MELLITUS WITH OTHER SPECIFIED COMPLICATION: ICD-10-CM

## 2020-06-28 DIAGNOSIS — N17.9 ACUTE KIDNEY FAILURE, UNSPECIFIED: ICD-10-CM

## 2020-06-28 DIAGNOSIS — I10 ESSENTIAL (PRIMARY) HYPERTENSION: ICD-10-CM

## 2020-06-28 PROBLEM — J44.9 CHRONIC OBSTRUCTIVE PULMONARY DISEASE, UNSPECIFIED: Chronic | Status: ACTIVE | Noted: 2020-06-16

## 2020-06-28 PROBLEM — E78.00 PURE HYPERCHOLESTEROLEMIA, UNSPECIFIED: Chronic | Status: ACTIVE | Noted: 2020-06-16

## 2020-06-28 PROBLEM — E11.9 TYPE 2 DIABETES MELLITUS WITHOUT COMPLICATIONS: Chronic | Status: ACTIVE | Noted: 2020-06-16

## 2020-06-28 LAB
ALBUMIN SERPL ELPH-MCNC: 3.2 G/DL — LOW (ref 3.5–5.2)
ALP SERPL-CCNC: 103 U/L — SIGNIFICANT CHANGE UP (ref 30–115)
ALT FLD-CCNC: 14 U/L — SIGNIFICANT CHANGE UP (ref 0–41)
ANION GAP SERPL CALC-SCNC: 12 MMOL/L — SIGNIFICANT CHANGE UP (ref 7–14)
APPEARANCE UR: CLEAR — SIGNIFICANT CHANGE UP
AST SERPL-CCNC: 29 U/L — SIGNIFICANT CHANGE UP (ref 0–41)
BACTERIA # UR AUTO: ABNORMAL
BASE EXCESS BLDV CALC-SCNC: -2.9 MMOL/L — LOW (ref -2–2)
BILIRUB SERPL-MCNC: 0.3 MG/DL — SIGNIFICANT CHANGE UP (ref 0.2–1.2)
BILIRUB UR-MCNC: NEGATIVE — SIGNIFICANT CHANGE UP
BUN SERPL-MCNC: 104 MG/DL — CRITICAL HIGH (ref 10–20)
CA-I SERPL-SCNC: 1.13 MMOL/L — SIGNIFICANT CHANGE UP (ref 1.12–1.3)
CALCIUM SERPL-MCNC: 7.6 MG/DL — LOW (ref 8.5–10.1)
CHLORIDE SERPL-SCNC: 102 MMOL/L — SIGNIFICANT CHANGE UP (ref 98–110)
CK MB CFR SERPL CALC: 5 NG/ML — SIGNIFICANT CHANGE UP (ref 0.6–6.3)
CK SERPL-CCNC: 94 U/L — SIGNIFICANT CHANGE UP (ref 0–225)
CO2 SERPL-SCNC: 23 MMOL/L — SIGNIFICANT CHANGE UP (ref 17–32)
COLOR SPEC: YELLOW — SIGNIFICANT CHANGE UP
CREAT SERPL-MCNC: 2.7 MG/DL — HIGH (ref 0.7–1.5)
DIFF PNL FLD: ABNORMAL
GAS PNL BLDV: 140 MMOL/L — SIGNIFICANT CHANGE UP (ref 136–145)
GAS PNL BLDV: SIGNIFICANT CHANGE UP
GLUCOSE BLDC GLUCOMTR-MCNC: 134 MG/DL — HIGH (ref 70–99)
GLUCOSE BLDC GLUCOMTR-MCNC: 54 MG/DL — LOW (ref 70–99)
GLUCOSE BLDC GLUCOMTR-MCNC: 59 MG/DL — LOW (ref 70–99)
GLUCOSE BLDC GLUCOMTR-MCNC: 59 MG/DL — LOW (ref 70–99)
GLUCOSE SERPL-MCNC: 50 MG/DL — LOW (ref 70–99)
GLUCOSE UR QL: NEGATIVE MG/DL — SIGNIFICANT CHANGE UP
HCO3 BLDV-SCNC: 24 MMOL/L — SIGNIFICANT CHANGE UP (ref 22–29)
HCT VFR BLD CALC: 24.8 % — LOW (ref 37–47)
HCT VFR BLDA CALC: 23.9 % — LOW (ref 34–44)
HGB BLD CALC-MCNC: 7.8 G/DL — LOW (ref 14–18)
HGB BLD-MCNC: 7.7 G/DL — LOW (ref 12–16)
HOROWITZ INDEX BLDV+IHG-RTO: 21 — SIGNIFICANT CHANGE UP
KETONES UR-MCNC: NEGATIVE — SIGNIFICANT CHANGE UP
LACTATE BLDV-MCNC: 1.1 MMOL/L — SIGNIFICANT CHANGE UP (ref 0.5–1.6)
LEUKOCYTE ESTERASE UR-ACNC: ABNORMAL
MANUAL SMEAR VERIFICATION: SIGNIFICANT CHANGE UP
MCHC RBC-ENTMCNC: 29.7 PG — SIGNIFICANT CHANGE UP (ref 27–31)
MCHC RBC-ENTMCNC: 31 G/DL — LOW (ref 32–37)
MCV RBC AUTO: 95.8 FL — SIGNIFICANT CHANGE UP (ref 81–99)
MICROCYTES BLD QL: SIGNIFICANT CHANGE UP
NITRITE UR-MCNC: NEGATIVE — SIGNIFICANT CHANGE UP
NRBC # BLD: 0 /100 — SIGNIFICANT CHANGE UP (ref 0–0)
NRBC # BLD: SIGNIFICANT CHANGE UP /100 WBCS (ref 0–0)
NT-PROBNP SERPL-SCNC: 1424 PG/ML — HIGH (ref 0–300)
NT-PROBNP SERPL-SCNC: 1454 PG/ML — HIGH (ref 0–300)
PCO2 BLDV: 52 MMHG — HIGH (ref 41–51)
PH BLDV: 7.27 — SIGNIFICANT CHANGE UP (ref 7.26–7.43)
PH UR: 5.5 — SIGNIFICANT CHANGE UP (ref 5–8)
PLAT MORPH BLD: ABNORMAL
PLATELET # BLD AUTO: 83 K/UL — LOW (ref 130–400)
PLATELET COUNT - ESTIMATE: ABNORMAL
PO2 BLDV: 23 MMHG — SIGNIFICANT CHANGE UP (ref 20–40)
POTASSIUM BLDV-SCNC: 4.9 MMOL/L — SIGNIFICANT CHANGE UP (ref 3.3–5.6)
POTASSIUM SERPL-MCNC: 4.9 MMOL/L — SIGNIFICANT CHANGE UP (ref 3.5–5)
POTASSIUM SERPL-SCNC: 4.9 MMOL/L — SIGNIFICANT CHANGE UP (ref 3.5–5)
PROT SERPL-MCNC: 5.8 G/DL — LOW (ref 6–8)
PROT UR-MCNC: 30 MG/DL
RBC # BLD: 2.59 M/UL — LOW (ref 4.2–5.4)
RBC # FLD: 15.8 % — HIGH (ref 11.5–14.5)
RBC BLD AUTO: ABNORMAL
RBC CASTS # UR COMP ASSIST: SIGNIFICANT CHANGE UP /HPF
SAO2 % BLDV: 45 % — SIGNIFICANT CHANGE UP
SARS-COV-2 RNA SPEC QL NAA+PROBE: SIGNIFICANT CHANGE UP
SODIUM SERPL-SCNC: 137 MMOL/L — SIGNIFICANT CHANGE UP (ref 135–146)
SP GR SPEC: 1.01 — SIGNIFICANT CHANGE UP (ref 1.01–1.03)
TROPONIN T SERPL-MCNC: 0.04 NG/ML — CRITICAL HIGH
TROPONIN T SERPL-MCNC: 0.04 NG/ML — CRITICAL HIGH
UROBILINOGEN FLD QL: 0.2 MG/DL — SIGNIFICANT CHANGE UP (ref 0.2–0.2)
WBC # BLD: 6.56 K/UL — SIGNIFICANT CHANGE UP (ref 4.8–10.8)
WBC # FLD AUTO: 6.56 K/UL — SIGNIFICANT CHANGE UP (ref 4.8–10.8)
WBC UR QL: ABNORMAL /HPF

## 2020-06-28 PROCEDURE — 36000 PLACE NEEDLE IN VEIN: CPT

## 2020-06-28 PROCEDURE — 99223 1ST HOSP IP/OBS HIGH 75: CPT

## 2020-06-28 PROCEDURE — 71045 X-RAY EXAM CHEST 1 VIEW: CPT | Mod: 26

## 2020-06-28 PROCEDURE — 99285 EMERGENCY DEPT VISIT HI MDM: CPT | Mod: 25

## 2020-06-28 RX ORDER — ATORVASTATIN CALCIUM 80 MG/1
40 TABLET, FILM COATED ORAL AT BEDTIME
Refills: 0 | Status: DISCONTINUED | OUTPATIENT
Start: 2020-06-28 | End: 2020-06-28

## 2020-06-28 RX ORDER — INSULIN LISPRO 100/ML
15 VIAL (ML) SUBCUTANEOUS
Refills: 0 | Status: DISCONTINUED | OUTPATIENT
Start: 2020-06-28 | End: 2020-06-30

## 2020-06-28 RX ORDER — SODIUM CHLORIDE 9 MG/ML
1000 INJECTION, SOLUTION INTRAVENOUS
Refills: 0 | Status: DISCONTINUED | OUTPATIENT
Start: 2020-06-28 | End: 2020-07-01

## 2020-06-28 RX ORDER — INSULIN GLARGINE 100 [IU]/ML
20 INJECTION, SOLUTION SUBCUTANEOUS ONCE
Refills: 0 | Status: COMPLETED | OUTPATIENT
Start: 2020-06-28 | End: 2020-06-28

## 2020-06-28 RX ORDER — HEPARIN SODIUM 5000 [USP'U]/ML
5000 INJECTION INTRAVENOUS; SUBCUTANEOUS EVERY 12 HOURS
Refills: 0 | Status: DISCONTINUED | OUTPATIENT
Start: 2020-06-28 | End: 2020-07-01

## 2020-06-28 RX ORDER — DEXTROSE 50 % IN WATER 50 %
12.5 SYRINGE (ML) INTRAVENOUS ONCE
Refills: 0 | Status: DISCONTINUED | OUTPATIENT
Start: 2020-06-28 | End: 2020-07-01

## 2020-06-28 RX ORDER — FUROSEMIDE 40 MG
80 TABLET ORAL ONCE
Refills: 0 | Status: COMPLETED | OUTPATIENT
Start: 2020-06-28 | End: 2020-06-28

## 2020-06-28 RX ORDER — INSULIN GLARGINE 100 [IU]/ML
40 INJECTION, SOLUTION SUBCUTANEOUS AT BEDTIME
Refills: 0 | Status: DISCONTINUED | OUTPATIENT
Start: 2020-06-28 | End: 2020-06-30

## 2020-06-28 RX ORDER — ATENOLOL 25 MG/1
25 TABLET ORAL DAILY
Refills: 0 | Status: DISCONTINUED | OUTPATIENT
Start: 2020-06-28 | End: 2020-07-01

## 2020-06-28 RX ORDER — FENOFIBRATE,MICRONIZED 130 MG
145 CAPSULE ORAL DAILY
Refills: 0 | Status: DISCONTINUED | OUTPATIENT
Start: 2020-06-28 | End: 2020-07-01

## 2020-06-28 RX ORDER — INSULIN LISPRO 100/ML
VIAL (ML) SUBCUTANEOUS
Refills: 0 | Status: DISCONTINUED | OUTPATIENT
Start: 2020-06-28 | End: 2020-07-01

## 2020-06-28 RX ORDER — ATORVASTATIN CALCIUM 80 MG/1
10 TABLET, FILM COATED ORAL AT BEDTIME
Refills: 0 | Status: DISCONTINUED | OUTPATIENT
Start: 2020-06-28 | End: 2020-07-01

## 2020-06-28 RX ORDER — FUROSEMIDE 40 MG
40 TABLET ORAL EVERY 12 HOURS
Refills: 0 | Status: DISCONTINUED | OUTPATIENT
Start: 2020-06-29 | End: 2020-06-29

## 2020-06-28 RX ADMIN — ATORVASTATIN CALCIUM 10 MILLIGRAM(S): 80 TABLET, FILM COATED ORAL at 22:14

## 2020-06-28 RX ADMIN — HEPARIN SODIUM 5000 UNIT(S): 5000 INJECTION INTRAVENOUS; SUBCUTANEOUS at 18:44

## 2020-06-28 RX ADMIN — ATENOLOL 25 MILLIGRAM(S): 25 TABLET ORAL at 18:43

## 2020-06-28 RX ADMIN — INSULIN GLARGINE 20 UNIT(S): 100 INJECTION, SOLUTION SUBCUTANEOUS at 22:14

## 2020-06-28 RX ADMIN — Medication 145 MILLIGRAM(S): at 18:43

## 2020-06-28 RX ADMIN — Medication 80 MILLIGRAM(S): at 13:23

## 2020-06-28 NOTE — ED PROVIDER NOTE - NS ED ROS FT
Constitutional:  see HPI  Head:  no headache, dizziness, loss of consciousness  Eyes:  no visual changes; no eye pain, redness, or discharge  ENMT:  no ear pain or discharge; no hearing problems; no mouth or throat sores or lesions; no throat pain  Cardiac: no chest pain, tachycardia or palpitations  Respiratory: sob  GI: abd distension; no nausea, vomiting, diarrhea or abdominal pain  :  no dysuria, frequency, or burning with urination; no change in urine output  MS: calf swelling  Neuro: no weakness; no numbness or tingling; no seizure  Skin:  no rashes or color changes; no lacerations or abrasions

## 2020-06-28 NOTE — ED PROVIDER NOTE - CLINICAL SUMMARY MEDICAL DECISION MAKING FREE TEXT BOX
Patient presents for evaluation of worsening sob and swelling she is no in resp distress but has increased wob with 10lb weight gain with new b/l edema I will admit for further workup up

## 2020-06-28 NOTE — H&P ADULT - NSICDXPASTMEDICALHX_GEN_ALL_CORE_FT
PAST MEDICAL HISTORY:  Chronic obstructive pulmonary disease     Diabetes mellitus, type 2     High blood cholesterol     Hypertension

## 2020-06-28 NOTE — H&P ADULT - NSHPPHYSICALEXAM_GEN_ALL_CORE
GENERAL:  64y/o Female NAD, resting comfortably.  HEAD:  Atraumatic, Normocephalic  EYES: EOMI, PERRLA, conjunctiva and sclera clear  NECK: Supple, No JVD, no cervical lymphadenopathy, non-tender  CHEST/LUNG: Clear to auscultation bilaterally; mild rales b/l l/l  HEART: Regular rate and rhythm; S1&S2  ABDOMEN: Soft, Nontender, Nondistended x 4 quadrants; Bowel sounds present  EXTREMITIES:   Peripheral Pulses Present, 2+ edema b/l l/e  PSYCH: AAOx3, cooperative, appropriate  NEUROLOGY: WNL  SKIN: WNL

## 2020-06-28 NOTE — H&P ADULT - NSHPLABSRESULTS_GEN_ALL_CORE
7.7    6.56  )-----------( 83       ( 2020 13:05 )             24.8           137  |  102  |  104<HH>  ----------------------------<  50<L>  4.9   |  23  |  2.7<H>    Ca    7.6<L>      2020 13:05    TPro  5.8<L>  /  Alb  3.2<L>  /  TBili  0.3  /  DBili  x   /  AST  29  /  ALT  14  /  AlkPhos  103                Urinalysis Basic - ( 2020 14:05 )    Color: Yellow / Appearance: Clear / S.015 / pH: x  Gluc: x / Ketone: Negative  / Bili: Negative / Urobili: 0.2 mg/dL   Blood: x / Protein: 30 mg/dL / Nitrite: Negative   Leuk Esterase: Trace / RBC: 1-2 /HPF / WBC 6-10 /HPF   Sq Epi: x / Non Sq Epi: x / Bacteria: Few            Lactate Trend      CARDIAC MARKERS ( 2020 13:05 )  x     / 0.04 ng/mL / x     / x     / x            CAPILLARY BLOOD GLUCOSE        Culture Results:   No growth ( @ 04:25)  Culture Results:   No Growth Final ( @ 04:10)  Culture Results:   No Growth Final ( @ 04:10)    PENDING CXR

## 2020-06-28 NOTE — ED PROVIDER NOTE - OBJECTIVE STATEMENT
64 yo female with hx of htn, hld, copd not on o2, dm, recently diagnosed chf presenting with 1 week of worsening sob associated with orthopnea, b/l calf swelling, abdominal distension, 10 lb weight gain over 2 weeks, vomiting x2 NBNB episodes yesterday. denies cp, fever, worsening cough, abd pain, diarrhea, hx of clots. On lasix 40 BID and recently started bumex (?) at home. 64 yo female with hx of htn, hld, copd not on o2, dm, recently diagnosed chf presenting with 1 week of worsening sob associated with orthopnea, b/l calf swelling, abdominal distension, 10 lb weight gain over 2 weeks, vomiting x2 NBNB episodes yesterday. denies cp, fever, worsening cough, abd pain, diarrhea, hx of clots. On lasix 40 BID and recently started bumex (?) at home.    Cardiology: kelli

## 2020-06-28 NOTE — H&P ADULT - NSHPREVIEWOFSYSTEMS_GEN_ALL_CORE
REVIEW OF SYSTEMS:    CONSTITUTIONAL: General weakness, fevers or chills  EYES/ENT: No visual changes;  No vertigo or throat pain   NECK: No pain or stiffness  RESPIRATORY: see HPI  CARDIOVASCULAR: No chest pain or palpitations  GASTROINTESTINAL: No abdominal or epigastric pain. No nausea, vomiting, or hematemesis; No diarrhea or constipation. No melena or hematochezia.  GENITOURINARY: No dysuria, frequency or hematuria  NEUROLOGICAL: No numbness or weakness  SKIN: No itching, rashes

## 2020-06-28 NOTE — H&P ADULT - PROBLEM SELECTOR PLAN 1
Admit low risk telemetry  Echocardiogram  Lasix IV BID  Monitor I/O  Monitor V/s  Repeat labs  Follow H/H

## 2020-06-28 NOTE — ED PROVIDER NOTE - PHYSICAL EXAMINATION
CONSTITUTIONAL: Well-developed; well-nourished; in mild respiratory distress  SKIN: warm, dry  HEAD: Normocephalic; atraumatic.  EYES: PERRL, EOMI, normal sclera and conjunctiva   ENT: No nasal discharge; airway clear.  NECK: Supple; non tender.  CARD: S1, S2 normal; no murmurs, gallops, or rubs. Regular rate and rhythm.   RESP: mild crackles, rhonchi b/l  ABD: soft NT, distended  EXT: Normal ROM.  b/l calf edema  LYMPH: No acute cervical adenopathy.  NEURO: Alert, oriented, grossly unremarkable  PSYCH: Cooperative, appropriate.

## 2020-06-28 NOTE — ED PROVIDER NOTE - PROGRESS NOTE DETAILS
ATTENDING NOTE: 66 y/o F PMH HTN, HLD, DM, COPD and a recent diagnosis of CHF presents to the ED c/o SOB worsening over the last week associated with b/l calf swelling and abdominal distention. Pt endorses two episodes of vomiting yesterday but denies any fevers, chills, chest pain, back pain, ABD pain or diarrhea. As of note, Pt is on Lasix at home. On exam: NCAT. PERRLA, EOMI. OP clear. Lungs (+) Rhonchi b/l. RRR, S1S2 noted. Radial pulses 2+ and equal, pedal pulses 2+ and equal. Abdomen soft, NT/(+) Distended, no rebound or guarding. FROM x4 extremities, (+) b/l leg edema. No focal neuro deficits. A/P: Pt with recent diagnosis of CHF. Labs, imaging, Lasix. Will continue to monitor.

## 2020-06-28 NOTE — H&P ADULT - HISTORY OF PRESENT ILLNESS
66 yo female with hx of htn, hld, copd not on o2, dm, recently diagnosed chf presenting with 1 week of worsening sob associated with orthopnea, b/l calf swelling, abdominal distension, 10 lb weight gain over 2 weeks, vomiting x2 NBNB episodes yesterday. denies cp, fever, worsening cough, abd pain, diarrhea, hx of clots. On lasix 40 BID and recently started bumex (?) at home.

## 2020-06-28 NOTE — ED PROVIDER NOTE - PMH
Chronic obstructive pulmonary disease    Diabetes mellitus, type 2    High blood cholesterol    Hypertension

## 2020-06-28 NOTE — H&P ADULT - ATTENDING COMMENTS
#Progress Note Handoff:  Pending (specify):  Consults_________, Tests________, Test Results_______, Other___diuresis______  Family discussion:d/w pt at bedside re: treatment plan, primary dx  Disposition: Home__x_/SNF___/Other________/Unknown at this time________

## 2020-06-28 NOTE — ED PROVIDER NOTE - ATTENDING CONTRIBUTION TO CARE
I was present for and supervised the key and critical aspects of the procedures performed during the care of the patient. ATTENDING NOTE: 64 y/o F PMH HTN, HLD, DM, COPD and a recent diagnosis of CHF presents to the ED c/o SOB worsening over the last week associated with b/l calf swelling and abdominal distention. Pt endorses two episodes of vomiting yesterday but denies any fevers, chills, chest pain, back pain, ABD pain or diarrhea. As of note, Pt is on Lasix at home. On exam: NCAT. PERRLA, EOMI. OP clear. Lungs (+) Rhonchi b/l. RRR, S1S2 noted. Radial pulses 2+ and equal, pedal pulses 2+ and equal. Abdomen soft, NT/(+) Distended, no rebound or guarding. FROM x4 extremities, (+) b/l leg edema. No focal neuro deficits. A/P: Pt with recent diagnosis of CHF. Labs, imaging, Lasix. Will continue to monitor.

## 2020-06-29 LAB
A1C WITH ESTIMATED AVERAGE GLUCOSE RESULT: 5 % — SIGNIFICANT CHANGE UP (ref 4–5.6)
ANION GAP SERPL CALC-SCNC: 11 MMOL/L — SIGNIFICANT CHANGE UP (ref 7–14)
BUN SERPL-MCNC: 100 MG/DL — CRITICAL HIGH (ref 10–20)
CALCIUM SERPL-MCNC: 8.1 MG/DL — LOW (ref 8.5–10.1)
CHLORIDE SERPL-SCNC: 106 MMOL/L — SIGNIFICANT CHANGE UP (ref 98–110)
CK MB CFR SERPL CALC: 4.6 NG/ML — SIGNIFICANT CHANGE UP (ref 0.6–6.3)
CO2 SERPL-SCNC: 24 MMOL/L — SIGNIFICANT CHANGE UP (ref 17–32)
CREAT ?TM UR-MCNC: 25 MG/DL — SIGNIFICANT CHANGE UP
CREAT SERPL-MCNC: 2.3 MG/DL — HIGH (ref 0.7–1.5)
ESTIMATED AVERAGE GLUCOSE: 97 MG/DL — SIGNIFICANT CHANGE UP (ref 68–114)
GLUCOSE BLDC GLUCOMTR-MCNC: 119 MG/DL — HIGH (ref 70–99)
GLUCOSE BLDC GLUCOMTR-MCNC: 122 MG/DL — HIGH (ref 70–99)
GLUCOSE BLDC GLUCOMTR-MCNC: 137 MG/DL — HIGH (ref 70–99)
GLUCOSE BLDC GLUCOMTR-MCNC: 74 MG/DL — SIGNIFICANT CHANGE UP (ref 70–99)
GLUCOSE SERPL-MCNC: 93 MG/DL — SIGNIFICANT CHANGE UP (ref 70–99)
HCV AB S/CO SERPL IA: 0.07 COI — SIGNIFICANT CHANGE UP
HCV AB SERPL-IMP: SIGNIFICANT CHANGE UP
IRON SATN MFR SERPL: 15 % — SIGNIFICANT CHANGE UP (ref 15–50)
IRON SATN MFR SERPL: 57 UG/DL — SIGNIFICANT CHANGE UP (ref 35–150)
NT-PROBNP SERPL-SCNC: 1348 PG/ML — HIGH (ref 0–300)
POTASSIUM SERPL-MCNC: 4.6 MMOL/L — SIGNIFICANT CHANGE UP (ref 3.5–5)
POTASSIUM SERPL-SCNC: 4.6 MMOL/L — SIGNIFICANT CHANGE UP (ref 3.5–5)
PROT ?TM UR-MCNC: 11 MG/DLG/24H — SIGNIFICANT CHANGE UP
PROT/CREAT UR-RTO: 0.4 RATIO — HIGH (ref 0–0.2)
SODIUM SERPL-SCNC: 141 MMOL/L — SIGNIFICANT CHANGE UP (ref 135–146)
TIBC SERPL-MCNC: 376 UG/DL — SIGNIFICANT CHANGE UP (ref 220–430)
TROPONIN T SERPL-MCNC: 0.04 NG/ML — CRITICAL HIGH
UIBC SERPL-MCNC: 319 UG/DL — SIGNIFICANT CHANGE UP (ref 110–370)

## 2020-06-29 PROCEDURE — 99497 ADVNCD CARE PLAN 30 MIN: CPT

## 2020-06-29 PROCEDURE — 99233 SBSQ HOSP IP/OBS HIGH 50: CPT

## 2020-06-29 RX ORDER — FUROSEMIDE 40 MG
80 TABLET ORAL EVERY 12 HOURS
Refills: 0 | Status: DISCONTINUED | OUTPATIENT
Start: 2020-06-29 | End: 2020-07-01

## 2020-06-29 RX ADMIN — INSULIN GLARGINE 40 UNIT(S): 100 INJECTION, SOLUTION SUBCUTANEOUS at 21:20

## 2020-06-29 RX ADMIN — HEPARIN SODIUM 5000 UNIT(S): 5000 INJECTION INTRAVENOUS; SUBCUTANEOUS at 17:09

## 2020-06-29 RX ADMIN — Medication 80 MILLIGRAM(S): at 17:08

## 2020-06-29 RX ADMIN — Medication 145 MILLIGRAM(S): at 11:44

## 2020-06-29 RX ADMIN — ATENOLOL 25 MILLIGRAM(S): 25 TABLET ORAL at 06:00

## 2020-06-29 RX ADMIN — HEPARIN SODIUM 5000 UNIT(S): 5000 INJECTION INTRAVENOUS; SUBCUTANEOUS at 05:37

## 2020-06-29 RX ADMIN — Medication 40 MILLIGRAM(S): at 05:37

## 2020-06-29 RX ADMIN — ATORVASTATIN CALCIUM 10 MILLIGRAM(S): 80 TABLET, FILM COATED ORAL at 21:20

## 2020-06-29 RX ADMIN — Medication 15 UNIT(S): at 07:43

## 2020-06-29 NOTE — CONSULT NOTE ADULT - ASSESSMENT
chronic kidney disease stage 4 with chronic prerenal azotemic picture (cardiorenal / diuretics / GI bleed? / steroids)   - baseline Cr in 2's with eGFR 25%   - non-nephrotic proteinuria   - increased b/l renal echogenicity on renal sono  peripheral edema despite lasix 40mg po bid and addition of bumex by PMD last week without effect  anemia / thrombocytopenia  acute on chronic CHF  COPD / active smoker / chronic exertional dyspnea  DM2 on insulin  left AMA last week as pt moved to [place]  hematochezia from hemorrhoids     plan:    increase lasix 80mg iv q12h  will consider metolazone for additive diuretic if poor diuretic response to lasix alone   monitor UOP  fluid and salt restriction  trend renal function and lytes  no ACE-I / ARB  check echo  check PO4, PTH, SPEP, light chain assay  check U protein / Cr  check iron stores - may need epogen and venofer  d/w team / PA chronic kidney disease stage 4 with chronic prerenal azotemic picture (cardiorenal / diuretics / GI bleed? / steroids)   - baseline Cr in 2's with eGFR 25%   - non-nephrotic proteinuria   - increased b/l renal echogenicity on renal sono  peripheral edema despite lasix 40mg po bid and addition of bumex by PMD last week without effect  anemia / thrombocytopenia  acute on chronic CHF - probably more right heart failure   COPD / active smoker / chronic exertional dyspnea  DM2 on insulin  left AMA last week as pt moved to [place]  hematochezia from hemorrhoids     plan:    increase lasix 80mg iv q12h  will consider metolazone for additive diuretic if poor diuretic response to lasix alone   monitor UOP / strict I and O's  fluid and salt restriction  trend renal function and lytes  no ACE-I / ARB  check echo  check PO4, PTH, SPEP, light chain assay  check U protein / Cr  check iron stores - may need epogen and venofer  d/w team / PA

## 2020-06-29 NOTE — CONSULT NOTE ADULT - SUBJECTIVE AND OBJECTIVE BOX
NEPHROLOGY CONSULTATION NOTE    64 yo female with hx of htn, hld, copd not on o2, dm, recently diagnosed chf presenting with 1 week of worsening sob associated with orthopnea, b/l calf swelling, abdominal distension, 10 lb weight gain over 2 weeks, vomiting x2 NBNB episodes yesterday. denies cp, fever, worsening cough, abd pain, diarrhea, hx of clots. On lasix 40 BID and recently started bumex (?) at home.      PAST MEDICAL & SURGICAL HISTORY:  Hypertension  History of cholecystectomy  High blood cholesterol  Diabetes mellitus, type 2  Chronic obstructive pulmonary disease  History of cholecystectomy    Allergies:  No Known Allergies    Home Medications Reviewed    SOCIAL HISTORY:  Denies ETOH, drugs  active smoker  just moved to [place]    FAMILY HISTORY:  no pertinent medical history in first degree relatives      REVIEW OF SYSTEMS:  CONSTITUTIONAL: + weakness, fevers or chills  EYES/ENT: No visual changes;  No vertigo or throat pain   NECK: No pain or stiffness  RESPIRATORY: No cough, wheezing, hemoptysis; + shortness of breath  CARDIOVASCULAR: No chest pain or palpitations.  GASTROINTESTINAL: No abdominal or epigastric pain. No nausea, vomiting, or hematemesis; No diarrhea or constipation. No melena + hematochezia.  GENITOURINARY: No dysuria, frequency, foamy urine, urinary urgency, incontinence or hematuria  NEUROLOGICAL: No numbness or weakness  SKIN: No itching, burning, rashes, or lesions   VASCULAR: + bilateral lower extremity edema.   All other review of systems is negative unless indicated above.    PHYSICAL EXAM:  Constitutional: NAD  HEENT: anicteric sclera, oropharynx clear, MMM  Neck: No JVD  Respiratory: b/ rhonchi  Cardiovascular: S1, S2, RRR  + murmur  Gastrointestinal: BS+, soft, NT/ND  Extremities: No cyanosis or clubbing. 2-3+ b/l  peripheral edema below knees  Neurological: A/O x 3, no focal deficits  Psychiatric: Normal mood, normal affect  : No CVA tenderness  Skin: No rashes    Hospital Medications:   MEDICATIONS  (STANDING):  ATENolol  Tablet 25 milliGRAM(s) Oral daily  atorvastatin 10 milliGRAM(s) Oral at bedtime  dextrose 5%. 1000 milliLiter(s) (50 mL/Hr) IV Continuous <Continuous>  dextrose 50% Injectable 12.5 Gram(s) IV Push once  fenofibrate Tablet 145 milliGRAM(s) Oral daily  furosemide   Injectable 40 milliGRAM(s) IV Push every 12 hours  heparin   Injectable 5000 Unit(s) SubCutaneous every 12 hours  insulin glargine Injectable (LANTUS) 40 Unit(s) SubCutaneous at bedtime  insulin lispro (HumaLOG) corrective regimen sliding scale   SubCutaneous three times a day before meals  insulin lispro Injectable (HumaLOG) 15 Unit(s) SubCutaneous before breakfast  insulin lispro Injectable (HumaLOG) 15 Unit(s) SubCutaneous before lunch  insulin lispro Injectable (HumaLOG) 15 Unit(s) SubCutaneous before dinner        VITALS:  T(F): 98 (20 @ 05:43), Max: 98.8 (20 @ 21:00)  HR: 57 (20 @ 08:03)  BP: 161/61 (20 @ 08:03)  RR: 18 (20 @ 08:03)  SpO2: 99% (20 @ 08:03)  Wt(kg): --     @ 07:01  -   @ 07:00  --------------------------------------------------------  IN: 0 mL / OUT: 550 mL / NET: -550 mL      Height (cm): 160 ( @ 17:57)  Weight (kg): 81.647 ( @ 17:57)  BMI (kg/m2): 31.9 ( 17:57)  BSA (m2): 1.85 ( @ 17:57)    LABS:      141  |  106  |  100<HH>  ----------------------------<  93  4.6   |  24  |  2.3<H>    Ca    8.1<L>      2020 06:18    TPro  5.8<L>  /  Alb  3.2<L>  /  TBili  0.3  /  DBili      /  AST  29  /  ALT  14  /  AlkPhos  103                            7.7    6.56  )-----------( 83       ( 2020 13:05 )             24.8       Urine Studies:  Urinalysis Basic - ( 2020 14:05 )    Color: Yellow / Appearance: Clear / S.015 / pH:   Gluc:  / Ketone: Negative  / Bili: Negative / Urobili: 0.2 mg/dL   Blood:  / Protein: 30 mg/dL / Nitrite: Negative   Leuk Esterase: Trace / RBC: 1-2 /HPF / WBC 6-10 /HPF   Sq Epi:  / Non Sq Epi:  / Bacteria: Few          RADIOLOGY & ADDITIONAL STUDIES:

## 2020-06-29 NOTE — PROGRESS NOTE ADULT - SUBJECTIVE AND OBJECTIVE BOX
INTERVAL HPI/OVERNIGHT EVENTS:    SUBJECTIVE: Patient seen and examined at bedside.     no cp, sob, abd pain, fever  no sob, orthopnea, pnd, cough    OBJECTIVE:    VITAL SIGNS:  Vital Signs Last 24 Hrs  T(C): 36.7 (2020 05:43), Max: 37.1 (2020 21:00)  T(F): 98 (2020 05:43), Max: 98.8 (2020 21:00)  HR: 57 (2020 08:03) (57 - 81)  BP: 161/61 (2020 08:03) (161/61 - 196/74)  BP(mean): --  RR: 18 (2020 08:03) (18 - 18)  SpO2: 99% (2020 08:03) (97% - 99%)      PHYSICAL EXAM:    General: NAD  HEENT: NC/AT; PERRL, clear conjunctiva  Neck: supple  Respiratory: CTA b/l  Cardiovascular: +S1/S2; RRR  Abdomen: soft, NT/ND; +BS x4  Extremities: WWP, 2+ peripheral pulses b/l; 2+ pit edema bl  Skin: normal color and turgor; no rash  Neurological:    MEDICATIONS:  MEDICATIONS  (STANDING):  ATENolol  Tablet 25 milliGRAM(s) Oral daily  atorvastatin 10 milliGRAM(s) Oral at bedtime  dextrose 5%. 1000 milliLiter(s) (50 mL/Hr) IV Continuous <Continuous>  dextrose 50% Injectable 12.5 Gram(s) IV Push once  fenofibrate Tablet 145 milliGRAM(s) Oral daily  furosemide   Injectable 80 milliGRAM(s) IV Push every 12 hours  heparin   Injectable 5000 Unit(s) SubCutaneous every 12 hours  insulin glargine Injectable (LANTUS) 40 Unit(s) SubCutaneous at bedtime  insulin lispro (HumaLOG) corrective regimen sliding scale   SubCutaneous three times a day before meals  insulin lispro Injectable (HumaLOG) 15 Unit(s) SubCutaneous before breakfast  insulin lispro Injectable (HumaLOG) 15 Unit(s) SubCutaneous before lunch  insulin lispro Injectable (HumaLOG) 15 Unit(s) SubCutaneous before dinner    MEDICATIONS  (PRN):      ALLERGIES:  Allergies    No Known Allergies    Intolerances        LABS:                        7.7    6.56  )-----------( 83       ( 2020 13:05 )             24.8     Hemoglobin: 7.7 g/dL ( @ 13:05)    CBC Full  -  ( 2020 13:05 )  WBC Count : 6.56 K/uL  RBC Count : 2.59 M/uL  Hemoglobin : 7.7 g/dL  Hematocrit : 24.8 %  Platelet Count - Automated : 83 K/uL  Mean Cell Volume : 95.8 fL  Mean Cell Hemoglobin : 29.7 pg  Mean Cell Hemoglobin Concentration : 31.0 g/dL  Auto Neutrophil # : x  Auto Lymphocyte # : x  Auto Monocyte # : x  Auto Eosinophil # : x  Auto Basophil # : x  Auto Neutrophil % : x  Auto Lymphocyte % : x  Auto Monocyte % : x  Auto Eosinophil % : x  Auto Basophil % : x        141  |  106  |  100<HH>  ----------------------------<  93  4.6   |  24  |  2.3<H>    Ca    8.1<L>      2020 06:18    TPro  5.8<L>  /  Alb  3.2<L>  /  TBili  0.3  /  DBili  x   /  AST  29  /  ALT  14  /  AlkPhos  103      Creatinine Trend: 2.3<--, 2.7<--, 2.8<--, 2.8<--  LIVER FUNCTIONS - ( 2020 13:05 )  Alb: 3.2 g/dL / Pro: 5.8 g/dL / ALK PHOS: 103 U/L / ALT: 14 U/L / AST: 29 U/L / GGT: x               hs Troponin:        14:28 - VBG - pH: 7.27  | pCO2: 52    | pO2: 23    | Lactate: 1.1        Urinalysis Basic - ( 2020 14:05 )    Color: Yellow / Appearance: Clear / S.015 / pH: x  Gluc: x / Ketone: Negative  / Bili: Negative / Urobili: 0.2 mg/dL   Blood: x / Protein: 30 mg/dL / Nitrite: Negative   Leuk Esterase: Trace / RBC: 1-2 /HPF / WBC 6-10 /HPF   Sq Epi: x / Non Sq Epi: x / Bacteria: Few      CSF:                      EKG:   MICROBIOLOGY:    IMAGING:      Labs, imaging, EKG personally reviewed    RADIOLOGY & ADDITIONAL TESTS: Reviewed.

## 2020-06-29 NOTE — PROGRESS NOTE ADULT - ASSESSMENT
65F PMHx HTN, HLD, DM2, UNSPECIFIED CHF, CKD III here with dyspnea on exertion, generalized weakness.    #Generalized weakness, dyspnea on exertion  no hypoxia, satting well on ra  cxr clear  UNSPECIFIED CHF, check tte  lasix 80 iv bid  strict i/o  #CKD III  scr appears at baseline per renal  trend  lasix as above  serological w/u per renal  #HTN  atenolol 25  #HLD  lipitor 10  #DM2  lantus 40  humalog 15 tid  ssi  #DVT ppx  subq hep    #Progress Note Handoff:  Pending (specify):  Consults_________, Tests________, Test Results_______, Other____tte_____  Family discussion: d/w pt at bedside re: treatment plan, primary dx  Disposition: Home_x__/SNF___/Other________/Unknown at this time________

## 2020-06-29 NOTE — GOALS OF CARE CONVERSATION - ADVANCED CARE PLANNING - CONVERSATION DETAILS
D/w pt at bedside re: resuscitation. Pt does not have living will. Request for full resusicitation in scenario her heart were to stop, would want intubation as well.

## 2020-06-30 LAB
ALBUMIN SERPL ELPH-MCNC: 3.3 G/DL — LOW (ref 3.5–5.2)
ALP SERPL-CCNC: 87 U/L — SIGNIFICANT CHANGE UP (ref 30–115)
ALT FLD-CCNC: 11 U/L — SIGNIFICANT CHANGE UP (ref 0–41)
ANION GAP SERPL CALC-SCNC: 10 MMOL/L — SIGNIFICANT CHANGE UP (ref 7–14)
AST SERPL-CCNC: 17 U/L — SIGNIFICANT CHANGE UP (ref 0–41)
BILIRUB SERPL-MCNC: 0.4 MG/DL — SIGNIFICANT CHANGE UP (ref 0.2–1.2)
BUN SERPL-MCNC: 87 MG/DL — CRITICAL HIGH (ref 10–20)
CALCIUM SERPL-MCNC: 7.7 MG/DL — LOW (ref 8.4–10.5)
CALCIUM SERPL-MCNC: 8.3 MG/DL — LOW (ref 8.5–10.1)
CHLORIDE SERPL-SCNC: 106 MMOL/L — SIGNIFICANT CHANGE UP (ref 98–110)
CO2 SERPL-SCNC: 25 MMOL/L — SIGNIFICANT CHANGE UP (ref 17–32)
CREAT SERPL-MCNC: 2 MG/DL — HIGH (ref 0.7–1.5)
FERRITIN SERPL-MCNC: 34 NG/ML — SIGNIFICANT CHANGE UP (ref 15–150)
GLUCOSE BLDC GLUCOMTR-MCNC: 122 MG/DL — HIGH (ref 70–99)
GLUCOSE BLDC GLUCOMTR-MCNC: 183 MG/DL — HIGH (ref 70–99)
GLUCOSE BLDC GLUCOMTR-MCNC: 202 MG/DL — HIGH (ref 70–99)
GLUCOSE BLDC GLUCOMTR-MCNC: 79 MG/DL — SIGNIFICANT CHANGE UP (ref 70–99)
GLUCOSE SERPL-MCNC: 73 MG/DL — SIGNIFICANT CHANGE UP (ref 70–99)
HCT VFR BLD CALC: 24.8 % — LOW (ref 37–47)
HGB BLD-MCNC: 7.5 G/DL — LOW (ref 12–16)
INR BLD: 1.19 RATIO — SIGNIFICANT CHANGE UP (ref 0.65–1.3)
IRON SATN MFR SERPL: 14 % — LOW (ref 15–50)
IRON SATN MFR SERPL: 53 UG/DL — SIGNIFICANT CHANGE UP (ref 35–150)
KAPPA LC SER QL IFE: 4.94 MG/DL — HIGH (ref 0.33–1.94)
KAPPA/LAMBDA FREE LIGHT CHAIN RATIO, SERUM: 1.31 RATIO — SIGNIFICANT CHANGE UP (ref 0.26–1.65)
LAMBDA LC SER QL IFE: 3.76 MG/DL — HIGH (ref 0.57–2.63)
MAGNESIUM SERPL-MCNC: 1.7 MG/DL — LOW (ref 1.8–2.4)
MCHC RBC-ENTMCNC: 29 PG — SIGNIFICANT CHANGE UP (ref 27–31)
MCHC RBC-ENTMCNC: 30.2 G/DL — LOW (ref 32–37)
MCV RBC AUTO: 95.8 FL — SIGNIFICANT CHANGE UP (ref 81–99)
NRBC # BLD: 0 /100 WBCS — SIGNIFICANT CHANGE UP (ref 0–0)
PHOSPHATE SERPL-MCNC: 4.3 MG/DL — SIGNIFICANT CHANGE UP (ref 2.1–4.9)
PLATELET # BLD AUTO: 100 K/UL — LOW (ref 130–400)
POTASSIUM SERPL-MCNC: 4.6 MMOL/L — SIGNIFICANT CHANGE UP (ref 3.5–5)
POTASSIUM SERPL-SCNC: 4.6 MMOL/L — SIGNIFICANT CHANGE UP (ref 3.5–5)
PROT SERPL-MCNC: 5.5 G/DL — LOW (ref 6–8)
PROTHROM AB SERPL-ACNC: 13.7 SEC — HIGH (ref 9.95–12.87)
PTH-INTACT FLD-MCNC: 261 PG/ML — HIGH (ref 15–65)
RBC # BLD: 2.59 M/UL — LOW (ref 4.2–5.4)
RBC # FLD: 15.8 % — HIGH (ref 11.5–14.5)
SODIUM SERPL-SCNC: 141 MMOL/L — SIGNIFICANT CHANGE UP (ref 135–146)
TIBC SERPL-MCNC: 383 UG/DL — SIGNIFICANT CHANGE UP (ref 220–430)
UIBC SERPL-MCNC: 330 UG/DL — SIGNIFICANT CHANGE UP (ref 110–370)
WBC # BLD: 7 K/UL — SIGNIFICANT CHANGE UP (ref 4.8–10.8)
WBC # FLD AUTO: 7 K/UL — SIGNIFICANT CHANGE UP (ref 4.8–10.8)

## 2020-06-30 PROCEDURE — 99233 SBSQ HOSP IP/OBS HIGH 50: CPT

## 2020-06-30 RX ORDER — INSULIN GLARGINE 100 [IU]/ML
20 INJECTION, SOLUTION SUBCUTANEOUS AT BEDTIME
Refills: 0 | Status: DISCONTINUED | OUTPATIENT
Start: 2020-06-30 | End: 2020-07-01

## 2020-06-30 RX ORDER — IRON SUCROSE 20 MG/ML
200 INJECTION, SOLUTION INTRAVENOUS EVERY 24 HOURS
Refills: 0 | Status: DISCONTINUED | OUTPATIENT
Start: 2020-06-30 | End: 2020-07-01

## 2020-06-30 RX ORDER — ACETAMINOPHEN 500 MG
650 TABLET ORAL EVERY 4 HOURS
Refills: 0 | Status: DISCONTINUED | OUTPATIENT
Start: 2020-06-30 | End: 2020-07-01

## 2020-06-30 RX ORDER — ERYTHROPOIETIN 10000 [IU]/ML
20000 INJECTION, SOLUTION INTRAVENOUS; SUBCUTANEOUS ONCE
Refills: 0 | Status: COMPLETED | OUTPATIENT
Start: 2020-06-30 | End: 2020-06-30

## 2020-06-30 RX ORDER — MAGNESIUM SULFATE 500 MG/ML
2 VIAL (ML) INJECTION ONCE
Refills: 0 | Status: COMPLETED | OUTPATIENT
Start: 2020-06-30 | End: 2020-06-30

## 2020-06-30 RX ADMIN — Medication 4: at 11:52

## 2020-06-30 RX ADMIN — Medication 50 GRAM(S): at 15:01

## 2020-06-30 RX ADMIN — ATORVASTATIN CALCIUM 10 MILLIGRAM(S): 80 TABLET, FILM COATED ORAL at 21:42

## 2020-06-30 RX ADMIN — HEPARIN SODIUM 5000 UNIT(S): 5000 INJECTION INTRAVENOUS; SUBCUTANEOUS at 05:55

## 2020-06-30 RX ADMIN — ERYTHROPOIETIN 20000 UNIT(S): 10000 INJECTION, SOLUTION INTRAVENOUS; SUBCUTANEOUS at 13:55

## 2020-06-30 RX ADMIN — Medication 145 MILLIGRAM(S): at 11:52

## 2020-06-30 RX ADMIN — HEPARIN SODIUM 5000 UNIT(S): 5000 INJECTION INTRAVENOUS; SUBCUTANEOUS at 18:20

## 2020-06-30 RX ADMIN — Medication 80 MILLIGRAM(S): at 05:54

## 2020-06-30 RX ADMIN — Medication 650 MILLIGRAM(S): at 18:20

## 2020-06-30 RX ADMIN — INSULIN GLARGINE 20 UNIT(S): 100 INJECTION, SOLUTION SUBCUTANEOUS at 21:43

## 2020-06-30 RX ADMIN — ATENOLOL 25 MILLIGRAM(S): 25 TABLET ORAL at 08:20

## 2020-06-30 RX ADMIN — Medication 80 MILLIGRAM(S): at 18:19

## 2020-06-30 RX ADMIN — IRON SUCROSE 110 MILLIGRAM(S): 20 INJECTION, SOLUTION INTRAVENOUS at 13:55

## 2020-06-30 NOTE — DIETITIAN INITIAL EVALUATION ADULT. - PHYSICAL APPEARANCE
alert, oriented, OOB to chair. BMI-29.9 (used UBW 169lbs/76.8kg and 160cm), IBW- 52.3kg. Skin intact. GI - pt reports 4 loose BMs today and 6 BMs yesterday (pt states it is related to Lasix)- RN aware.

## 2020-06-30 NOTE — PROGRESS NOTE ADULT - SUBJECTIVE AND OBJECTIVE BOX
INTERVAL HPI/OVERNIGHT EVENTS:    SUBJECTIVE: Patient seen and examined at bedside.     no cp, sob, abd pain, fever  no sob, orthopnea, pnd, cough    OBJECTIVE:    VITAL SIGNS:  Vital Signs Last 24 Hrs  T(C): 36.8 (2020 05:31), Max: 37.4 (2020 13:38)  T(F): 98.3 (2020 05:31), Max: 99.4 (2020 13:38)  HR: 72 (2020 08:18) (62 - 72)  BP: 145/67 (2020 08:18) (145/67 - 163/65)  BP(mean): --  RR: 18 (2020 08:18) (16 - 18)  SpO2: 99% (2020 08:18) (99% - 99%)      PHYSICAL EXAM:    General: NAD  HEENT: NC/AT; PERRL, clear conjunctiva  Neck: supple  Respiratory: CTA b/l  Cardiovascular: +S1/S2; RRR  Abdomen: soft, NT/ND; +BS x4  Extremities: WWP, 2+ peripheral pulses b/l; 1+ pit edema bl  Skin: normal color and turgor; no rash  Neurological:    MEDICATIONS:  MEDICATIONS  (STANDING):  ATENolol  Tablet 25 milliGRAM(s) Oral daily  atorvastatin 10 milliGRAM(s) Oral at bedtime  dextrose 5%. 1000 milliLiter(s) (50 mL/Hr) IV Continuous <Continuous>  dextrose 50% Injectable 12.5 Gram(s) IV Push once  fenofibrate Tablet 145 milliGRAM(s) Oral daily  furosemide   Injectable 80 milliGRAM(s) IV Push every 12 hours  heparin   Injectable 5000 Unit(s) SubCutaneous every 12 hours  insulin glargine Injectable (LANTUS) 20 Unit(s) SubCutaneous at bedtime  insulin lispro (HumaLOG) corrective regimen sliding scale   SubCutaneous three times a day before meals  magnesium sulfate  IVPB 2 Gram(s) IV Intermittent once    MEDICATIONS  (PRN):      ALLERGIES:  Allergies    No Known Allergies    Intolerances        LABS:                        7.5    7.00  )-----------( 100      ( 2020 06:41 )             24.8     Hemoglobin: 7.5 g/dL ( @ 06:41)  Hemoglobin: 7.7 g/dL ( @ 13:05)    CBC Full  -  ( 2020 06:41 )  WBC Count : 7.00 K/uL  RBC Count : 2.59 M/uL  Hemoglobin : 7.5 g/dL  Hematocrit : 24.8 %  Platelet Count - Automated : 100 K/uL  Mean Cell Volume : 95.8 fL  Mean Cell Hemoglobin : 29.0 pg  Mean Cell Hemoglobin Concentration : 30.2 g/dL  Auto Neutrophil # : x  Auto Lymphocyte # : x  Auto Monocyte # : x  Auto Eosinophil # : x  Auto Basophil # : x  Auto Neutrophil % : x  Auto Lymphocyte % : x  Auto Monocyte % : x  Auto Eosinophil % : x  Auto Basophil % : x        141  |  106  |  87<HH>  ----------------------------<  73  4.6   |  25  |  2.0<H>    Ca    8.3<L>      2020 06:41  Phos  4.3     06-30  Mg     1.7     06-30    TPro  5.5<L>  /  Alb  3.3<L>  /  TBili  0.4  /  DBili  x   /  AST  17  /  ALT  11  /  AlkPhos  87  06-30    Creatinine Trend: 2.0<--, 2.3<--, 2.7<--, 2.8<--, 2.8<--  LIVER FUNCTIONS - ( 2020 06:41 )  Alb: 3.3 g/dL / Pro: 5.5 g/dL / ALK PHOS: 87 U/L / ALT: 11 U/L / AST: 17 U/L / GGT: x           PT/INR - ( 2020 06:41 )   PT: 13.70 sec;   INR: 1.19 ratio             hs Troponin:            Urinalysis Basic - ( 2020 14:05 )    Color: Yellow / Appearance: Clear / S.015 / pH: x  Gluc: x / Ketone: Negative  / Bili: Negative / Urobili: 0.2 mg/dL   Blood: x / Protein: 30 mg/dL / Nitrite: Negative   Leuk Esterase: Trace / RBC: 1-2 /HPF / WBC 6-10 /HPF   Sq Epi: x / Non Sq Epi: x / Bacteria: Few      CSF:                      EKG:   MICROBIOLOGY:    IMAGING:      Labs, imaging, EKG personally reviewed    RADIOLOGY & ADDITIONAL TESTS: Reviewed.

## 2020-06-30 NOTE — PROGRESS NOTE ADULT - ASSESSMENT
65F PMHx HTN, HLD, DM2, HFpEF, CKD III here with dyspnea on exertion, generalized weakness.    #Generalized weakness, dyspnea on exertion  no hypoxia, satting well on ra  cxr clear  in setting of HFpEF, chronic  lasix 80 iv bid  strict i/o  #MANISH on CKD III  resolving, suspect some component of cardiorenal  lasix as above  trend  serological w/u per renal  #HTN  atenolol 25  #HLD  lipitor 10  #DM2  lantus 40  humalog 15 tid  ssi  #DVT ppx  subq hep    #Progress Note Handoff:  Pending (specify):  Consults_________, Tests________, Test Results_______, Other____diuresis_____  Family discussion: d/w pt at bedside re: treatment plan, primary dx  Disposition: Home_x__/SNF___/Other________/Unknown at this time________

## 2020-06-30 NOTE — DIETITIAN INITIAL EVALUATION ADULT. - ADD RECOMMEND
continue current diet, consider C.diff testing. RD discussed therapeutic diet including new Na restriction, written materials were provided. Will follow.

## 2020-06-30 NOTE — DIETITIAN INITIAL EVALUATION ADULT. - OTHER INFO
Pt p/w dyspnea on exertion and generalized weakness. Pt was recently diagnosed with CHF. On lasix. CKD stage IV, Cr is stable per Nephrology. DM II - on insulin.

## 2020-06-30 NOTE — DIETITIAN INITIAL EVALUATION ADULT. - ENERGY NEEDS
Estimated energy needs: ~1670kcal/d (MSJ x 1.3)  Estimated protein needs: 46-61gm/d (0.6-0.8gm/kg UBW) - CKD IV and CHF considered  Estimated fluid needs: 1ml/kcal compared to 1.4-1.9L/d (CHF protocol) or per LIP if further fluid restriction needed

## 2020-06-30 NOTE — PROGRESS NOTE ADULT - SUBJECTIVE AND OBJECTIVE BOX
NEPHROLOGY FOLLOW UP NOTE    pt seen and examined  diuresing well, though UOP documentation incomplete  Cr better  less leg swelling  ambulating  no fever  BP's better      PAST MEDICAL & SURGICAL HISTORY:  Hypertension  History of cholecystectomy  High blood cholesterol  Diabetes mellitus, type 2  Chronic obstructive pulmonary disease  History of cholecystectomy    Allergies:  No Known Allergies    Home Medications Reviewed    SOCIAL HISTORY:  Denies ETOH, drugs  active smoker  just moved to [place]    FAMILY HISTORY:  no pertinent medical history in first degree relatives      REVIEW OF SYSTEMS:  CONSTITUTIONAL: + weakness, fevers or chills  EYES/ENT: No visual changes;  No vertigo or throat pain   NECK: No pain or stiffness  RESPIRATORY: No cough, wheezing, hemoptysis; + shortness of breath  CARDIOVASCULAR: No chest pain or palpitations.  GASTROINTESTINAL: No abdominal or epigastric pain. No nausea, vomiting, or hematemesis; No diarrhea or constipation. No melena + hematochezia.  GENITOURINARY: No dysuria, frequency, foamy urine, urinary urgency, incontinence or hematuria  NEUROLOGICAL: No numbness or weakness  SKIN: No itching, burning, rashes, or lesions   VASCULAR: + bilateral lower extremity edema.   All other review of systems is negative unless indicated above.    advance care planning and advance care directives reviewed and discussed    PHYSICAL EXAM:  Constitutional: NAD  HEENT: anicteric sclera, oropharynx clear, MMM  Neck: No JVD  Respiratory: b/ rhonchi  Cardiovascular: S1, S2, RRR  + murmur  Gastrointestinal: BS+, soft, NT/ND  Extremities: No cyanosis or clubbing. 2-3+ b/l  peripheral edema below knees  Neurological: A/O x 3, no focal deficits  Psychiatric: Normal mood, normal affect  : No CVA tenderness  Skin: No rashes    Hospital Medications:   MEDICATIONS  (STANDING):  ATENolol  Tablet 25 milliGRAM(s) Oral daily  atorvastatin 10 milliGRAM(s) Oral at bedtime  dextrose 5%. 1000 milliLiter(s) (50 mL/Hr) IV Continuous <Continuous>  dextrose 50% Injectable 12.5 Gram(s) IV Push once  epoetin eleno-epbx (RETACRIT) Injectable 88481 Unit(s) SubCutaneous once  fenofibrate Tablet 145 milliGRAM(s) Oral daily  furosemide   Injectable 80 milliGRAM(s) IV Push every 12 hours  heparin   Injectable 5000 Unit(s) SubCutaneous every 12 hours  insulin glargine Injectable (LANTUS) 20 Unit(s) SubCutaneous at bedtime  insulin lispro (HumaLOG) corrective regimen sliding scale   SubCutaneous three times a day before meals  iron sucrose IVPB 200 milliGRAM(s) IV Intermittent every 24 hours  magnesium sulfate  IVPB 2 Gram(s) IV Intermittent once        VITALS:  T(F): 98.3 (20 @ 05:31), Max: 99.4 (20 @ 13:38)  HR: 72 (20 @ 08:18)  BP: 145/67 (20 @ 08:18)  RR: 18 (20 @ 08:18)  SpO2: 99% (20 @ 08:18)  Wt(kg): --     @ 07:01  -   @ 07:00  --------------------------------------------------------  IN: 0 mL / OUT: 550 mL / NET: -550 mL     @ 07:01  -   @ 07:00  --------------------------------------------------------  IN: 760 mL / OUT: 700 mL / NET: 60 mL     @ 07:01  -   @ 13:01  --------------------------------------------------------  IN: 110 mL / OUT: 250 mL / NET: -140 mL      Height (cm): 160.02 ( @ 10:27)    LABS:      141  |  106  |  87<HH>  ----------------------------<  73  4.6   |  25  |  2.0<H>    Ca    8.3<L>      2020 06:41  Phos  4.3     06-30  Mg     1.7     06-30    TPro  5.5<L>  /  Alb  3.3<L>  /  TBili  0.4  /  DBili      /  AST  17  /  ALT  11  /  AlkPhos  87  -30                          7.5    7.00  )-----------( 100      ( 2020 06:41 )             24.8       Urine Studies:  Urinalysis Basic - ( 2020 14:05 )    Color: Yellow / Appearance: Clear / S.015 / pH:   Gluc:  / Ketone: Negative  / Bili: Negative / Urobili: 0.2 mg/dL   Blood:  / Protein: 30 mg/dL / Nitrite: Negative   Leuk Esterase: Trace / RBC: 1-2 /HPF / WBC 6-10 /HPF   Sq Epi:  / Non Sq Epi:  / Bacteria: Few      Protein/Creatinine Ratio Calculation: 0.4 Ratio ( @ 09:37)  Creatinine, Random Urine: 25 mg/dL ( @ 09:37)      RADIOLOGY & ADDITIONAL STUDIES:

## 2020-06-30 NOTE — DIETITIAN INITIAL EVALUATION ADULT. - DIET TYPE
pt reports good appetite at home, follows diabetic diet, NKFA, no PO supplements. No [employer]/cultural preferences re: food reported ?fluid restriction per LIP

## 2020-07-01 VITALS
RESPIRATION RATE: 18 BRPM | HEART RATE: 78 BPM | DIASTOLIC BLOOD PRESSURE: 70 MMHG | TEMPERATURE: 97 F | SYSTOLIC BLOOD PRESSURE: 150 MMHG

## 2020-07-01 LAB
% ALBUMIN: 51 % — SIGNIFICANT CHANGE UP
% ALPHA 1: 8.3 % — SIGNIFICANT CHANGE UP
% ALPHA 2: 10.5 % — SIGNIFICANT CHANGE UP
% BETA: 15.2 % — SIGNIFICANT CHANGE UP
% GAMMA: 15 % — SIGNIFICANT CHANGE UP
ALBUMIN SERPL ELPH-MCNC: 2.8 G/DL — LOW (ref 3.6–5.5)
ALBUMIN SERPL ELPH-MCNC: 3.1 G/DL — LOW (ref 3.5–5.2)
ALBUMIN/GLOB SERPL ELPH: 1.1 RATIO — SIGNIFICANT CHANGE UP
ALP SERPL-CCNC: 86 U/L — SIGNIFICANT CHANGE UP (ref 30–115)
ALPHA1 GLOB SERPL ELPH-MCNC: 0.4 G/DL — SIGNIFICANT CHANGE UP (ref 0.1–0.4)
ALPHA2 GLOB SERPL ELPH-MCNC: 0.6 G/DL — SIGNIFICANT CHANGE UP (ref 0.5–1)
ALT FLD-CCNC: 11 U/L — SIGNIFICANT CHANGE UP (ref 0–41)
ANION GAP SERPL CALC-SCNC: 10 MMOL/L — SIGNIFICANT CHANGE UP (ref 7–14)
AST SERPL-CCNC: 14 U/L — SIGNIFICANT CHANGE UP (ref 0–41)
B-GLOBULIN SERPL ELPH-MCNC: 0.8 G/DL — SIGNIFICANT CHANGE UP (ref 0.5–1)
BILIRUB SERPL-MCNC: 0.4 MG/DL — SIGNIFICANT CHANGE UP (ref 0.2–1.2)
BUN SERPL-MCNC: 78 MG/DL — CRITICAL HIGH (ref 10–20)
CALCIUM SERPL-MCNC: 8.3 MG/DL — LOW (ref 8.5–10.1)
CHLORIDE SERPL-SCNC: 103 MMOL/L — SIGNIFICANT CHANGE UP (ref 98–110)
CO2 SERPL-SCNC: 24 MMOL/L — SIGNIFICANT CHANGE UP (ref 17–32)
CREAT SERPL-MCNC: 2 MG/DL — HIGH (ref 0.7–1.5)
CULTURE RESULTS: SIGNIFICANT CHANGE UP
FERRITIN SERPL-MCNC: 36 NG/ML — SIGNIFICANT CHANGE UP (ref 15–150)
GAMMA GLOBULIN: 0.8 G/DL — SIGNIFICANT CHANGE UP (ref 0.6–1.6)
GLUCOSE BLDC GLUCOMTR-MCNC: 164 MG/DL — HIGH (ref 70–99)
GLUCOSE BLDC GLUCOMTR-MCNC: 97 MG/DL — SIGNIFICANT CHANGE UP (ref 70–99)
GLUCOSE SERPL-MCNC: 93 MG/DL — SIGNIFICANT CHANGE UP (ref 70–99)
HCT VFR BLD CALC: 23 % — LOW (ref 37–47)
HGB BLD-MCNC: 7.1 G/DL — LOW (ref 12–16)
MAGNESIUM SERPL-MCNC: 2.1 MG/DL — SIGNIFICANT CHANGE UP (ref 1.8–2.4)
MCHC RBC-ENTMCNC: 29.5 PG — SIGNIFICANT CHANGE UP (ref 27–31)
MCHC RBC-ENTMCNC: 30.9 G/DL — LOW (ref 32–37)
MCV RBC AUTO: 95.4 FL — SIGNIFICANT CHANGE UP (ref 81–99)
NRBC # BLD: 0 /100 WBCS — SIGNIFICANT CHANGE UP (ref 0–0)
PHOSPHATE SERPL-MCNC: 4.4 MG/DL — SIGNIFICANT CHANGE UP (ref 2.1–4.9)
PLATELET # BLD AUTO: 96 K/UL — LOW (ref 130–400)
POTASSIUM SERPL-MCNC: 4.2 MMOL/L — SIGNIFICANT CHANGE UP (ref 3.5–5)
POTASSIUM SERPL-SCNC: 4.2 MMOL/L — SIGNIFICANT CHANGE UP (ref 3.5–5)
PROT PATTERN SERPL ELPH-IMP: SIGNIFICANT CHANGE UP
PROT SERPL-MCNC: 5.3 G/DL — LOW (ref 6–8)
PROT SERPL-MCNC: 5.4 G/DL — LOW (ref 6–8.3)
PROT SERPL-MCNC: 5.4 G/DL — LOW (ref 6–8.3)
RBC # BLD: 2.41 M/UL — LOW (ref 4.2–5.4)
RBC # FLD: 15.9 % — HIGH (ref 11.5–14.5)
SODIUM SERPL-SCNC: 137 MMOL/L — SIGNIFICANT CHANGE UP (ref 135–146)
SPECIMEN SOURCE: SIGNIFICANT CHANGE UP
WBC # BLD: 6.39 K/UL — SIGNIFICANT CHANGE UP (ref 4.8–10.8)
WBC # FLD AUTO: 6.39 K/UL — SIGNIFICANT CHANGE UP (ref 4.8–10.8)

## 2020-07-01 PROCEDURE — 99238 HOSP IP/OBS DSCHRG MGMT 30/<: CPT

## 2020-07-01 RX ORDER — FUROSEMIDE 40 MG
1 TABLET ORAL
Qty: 180 | Refills: 0
Start: 2020-07-01 | End: 2020-09-28

## 2020-07-01 RX ORDER — FENOFIBRATE,MICRONIZED 130 MG
1 CAPSULE ORAL
Qty: 0 | Refills: 0 | DISCHARGE
Start: 2020-07-01

## 2020-07-01 RX ORDER — ATORVASTATIN CALCIUM 80 MG/1
0 TABLET, FILM COATED ORAL
Qty: 0 | Refills: 0 | DISCHARGE

## 2020-07-01 RX ORDER — ATENOLOL 25 MG/1
0 TABLET ORAL
Qty: 0 | Refills: 0 | DISCHARGE

## 2020-07-01 RX ORDER — FUROSEMIDE 40 MG
0 TABLET ORAL
Qty: 0 | Refills: 0 | DISCHARGE

## 2020-07-01 RX ORDER — FENOFIBRATE,MICRONIZED 130 MG
0 CAPSULE ORAL
Qty: 0 | Refills: 0 | DISCHARGE

## 2020-07-01 RX ORDER — ATORVASTATIN CALCIUM 80 MG/1
1 TABLET, FILM COATED ORAL
Qty: 0 | Refills: 0 | DISCHARGE
Start: 2020-07-01

## 2020-07-01 RX ORDER — ATENOLOL 25 MG/1
1 TABLET ORAL
Qty: 0 | Refills: 0 | DISCHARGE
Start: 2020-07-01

## 2020-07-01 RX ORDER — IRON SUCROSE 20 MG/ML
200 INJECTION, SOLUTION INTRAVENOUS ONCE
Refills: 0 | Status: COMPLETED | OUTPATIENT
Start: 2020-07-01 | End: 2020-07-01

## 2020-07-01 RX ADMIN — ATENOLOL 25 MILLIGRAM(S): 25 TABLET ORAL at 05:32

## 2020-07-01 RX ADMIN — Medication 80 MILLIGRAM(S): at 05:32

## 2020-07-01 RX ADMIN — Medication 2: at 11:32

## 2020-07-01 RX ADMIN — HEPARIN SODIUM 5000 UNIT(S): 5000 INJECTION INTRAVENOUS; SUBCUTANEOUS at 05:32

## 2020-07-01 RX ADMIN — Medication 145 MILLIGRAM(S): at 11:33

## 2020-07-01 RX ADMIN — IRON SUCROSE 110 MILLIGRAM(S): 20 INJECTION, SOLUTION INTRAVENOUS at 10:57

## 2020-07-01 NOTE — DISCHARGE NOTE PROVIDER - NSDCCPCAREPLAN_GEN_ALL_CORE_FT
PRINCIPAL DISCHARGE DIAGNOSIS  Diagnosis: CHF (congestive heart failure)  Assessment and Plan of Treatment: Continue lasix 80mg twice daily and metolazone 2.5mg oral three times a week. Follow up with nephrology within 1 weeks of discharge.      SECONDARY DISCHARGE DIAGNOSES  Diagnosis: Anemia  Assessment and Plan of Treatment: Follow up with your primary care doctor within 1 week to check your blood levels.

## 2020-07-01 NOTE — PROGRESS NOTE ADULT - SUBJECTIVE AND OBJECTIVE BOX
NEPHROLOGY FOLLOW UP NOTE    pt seen and examined  diuresing well, though UOP documentation remains incomplete  Cr stable  edema less  less leg swelling  no SOB / CP  demanding to go home today  refusing PRBC transfusion        PAST MEDICAL & SURGICAL HISTORY:  Hypertension  History of cholecystectomy  High blood cholesterol  Diabetes mellitus, type 2  Chronic obstructive pulmonary disease  History of cholecystectomy    Allergies:  No Known Allergies    Home Medications Reviewed    SOCIAL HISTORY:  Denies ETOH, drugs  active smoker  just moved to [place]    FAMILY HISTORY:  no pertinent medical history in first degree relatives      REVIEW OF SYSTEMS:  CONSTITUTIONAL: + weakness, fevers or chills  EYES/ENT: No visual changes;  No vertigo or throat pain   NECK: No pain or stiffness  RESPIRATORY: No cough, wheezing, hemoptysis; + shortness of breath  CARDIOVASCULAR: No chest pain or palpitations.  GASTROINTESTINAL: No abdominal or epigastric pain. No nausea, vomiting, or hematemesis; No diarrhea or constipation. No melena + hematochezia.  GENITOURINARY: No dysuria, frequency, foamy urine, urinary urgency, incontinence or hematuria  NEUROLOGICAL: No numbness or weakness  SKIN: No itching, burning, rashes, or lesions   VASCULAR: + bilateral lower extremity edema.   All other review of systems is negative unless indicated above.      PHYSICAL EXAM:  Constitutional: NAD  HEENT: anicteric sclera, oropharynx clear, MMM  Neck: No JVD  Respiratory: b/ rhonchi  Cardiovascular: S1, S2, RRR  + murmur  Gastrointestinal: BS+, soft, NT/ND  Extremities: No cyanosis or clubbing. 2-3+ b/l  peripheral edema below knees  Neurological: A/O x 3, no focal deficits  Psychiatric: Normal mood, normal affect  : No CVA tenderness  Skin: No rashes    Hospital Medications:   MEDICATIONS  (STANDING):  ATENolol  Tablet 25 milliGRAM(s) Oral daily  atorvastatin 10 milliGRAM(s) Oral at bedtime  dextrose 5%. 1000 milliLiter(s) (50 mL/Hr) IV Continuous <Continuous>  dextrose 50% Injectable 12.5 Gram(s) IV Push once  fenofibrate Tablet 145 milliGRAM(s) Oral daily  furosemide   Injectable 80 milliGRAM(s) IV Push every 12 hours  heparin   Injectable 5000 Unit(s) SubCutaneous every 12 hours  insulin glargine Injectable (LANTUS) 20 Unit(s) SubCutaneous at bedtime  insulin lispro (HumaLOG) corrective regimen sliding scale   SubCutaneous three times a day before meals  iron sucrose IVPB 200 milliGRAM(s) IV Intermittent every 24 hours        VITALS:  T(F): 97.2 (20 @ 05:33), Max: 98.9 (20 @ 21:06)  HR: 78 (20 @ 05:33)  BP: 150/70 (20 @ 05:33)  RR: 18 (20 @ 05:33)  SpO2: 97% (20 @ 21:41)  Wt(kg): --     @ 07:  -   @ 07:00  --------------------------------------------------------  IN: 760 mL / OUT: 700 mL / NET: 60 mL     @ 07:01  -   @ 07:00  --------------------------------------------------------  IN: 110 mL / OUT: 650 mL / NET: -540 mL      Height (cm): 160.02 ( @ 10:27)    LABS:      137  |  103  |  78<HH>  ----------------------------<  93  4.2   |  24  |  2.0<H>    Ca    8.3<L>      2020 04:30  Phos  4.4       Mg     2.1         TPro  5.3<L>  /  Alb  3.1<L>  /  TBili  0.4  /  DBili      /  AST  14  /  ALT  11  /  AlkPhos  86                            7.1    6.39  )-----------( 96       ( 2020 04:30 )             23.0       Urine Studies:  Urinalysis Basic - ( 2020 14:05 )    Color: Yellow / Appearance: Clear / S.015 / pH:   Gluc:  / Ketone: Negative  / Bili: Negative / Urobili: 0.2 mg/dL   Blood:  / Protein: 30 mg/dL / Nitrite: Negative   Leuk Esterase: Trace / RBC: 1-2 /HPF / WBC 6-10 /HPF   Sq Epi:  / Non Sq Epi:  / Bacteria: Few      Protein/Creatinine Ratio Calculation: 0.4 Ratio ( @ 09:37)  Creatinine, Random Urine: 25 mg/dL ( @ 09:37)      RADIOLOGY & ADDITIONAL STUDIES:

## 2020-07-01 NOTE — DISCHARGE NOTE PROVIDER - CARE PROVIDER_API CALL
PCP,   Phone: (   )    -  Fax: (   )    -  Follow Up Time: 1 week    Diallo Ybarra  INTERNAL MEDICINE  [id number] [place] [address]  Phone: [phone]  Fax: [phone]  Follow Up Time: 1 week

## 2020-07-01 NOTE — DISCHARGE NOTE PROVIDER - NSDCMRMEDTOKEN_GEN_ALL_CORE_FT
atenolol: noted by intensivist  atorvastatin:   fenofibrate:   furosemide: orally once a day  insulin lispro 100 units/mL injectable solution (obsolete): 15  injectable once a day after dinner  Lantus 100 units/mL subcutaneous solution: 40  subcutaneous once a day (at bedtime)  levoFLOXacin 500 mg oral tablet: 1 tab(s) orally every 48 hours atenolol 25 mg oral tablet: 1 tab(s) orally once a day  atorvastatin 10 mg oral tablet: 1 tab(s) orally once a day (at bedtime)  fenofibrate 145 mg oral tablet: 1 tab(s) orally once a day  furosemide 80 mg oral tablet: 1 tab(s) orally 2 times a day   insulin lispro 100 units/mL injectable solution (obsolete): 15  injectable once a day after dinner  Lantus 100 units/mL subcutaneous solution: 40  subcutaneous once a day (at bedtime)  metOLazone 2.5 mg oral tablet: 1 tab(s) orally Monday, Wednesday, and Friday

## 2020-07-01 NOTE — PROGRESS NOTE ADULT - ASSESSMENT
65F PMHx HTN, HLD, DM2, HFpEF, CKD III here with dyspnea on exertion, generalized weakness.    #Generalized weakness, dyspnea on exertion  no hypoxia, satting well on ra  cxr clear  in setting of HFpEF, chronic  lasix 80 iv bid- will switch to lasix 80mg BID and metolazone 2.5mg PO MWF on discharge today  strict i/o  DC planning    #Anemia, likely due to renal disease  s/p epogen and venofer. Will give another dose of venofer today  Pt does not want blood transfusion nor further workup inpatient stating she is always anemic  CBC and BMP to be followed up outpatient within 1 week    #MANISH on CKD III  resolving, suspect some component of cardiorenal  lasix as above  trend  serological w/u per renal    #HTN  atenolol 25    #HLD  lipitor 10    #DM2  lantus 40  humalog 15 tid  ssi    #DVT ppx  subq hep    #Progress Note Handoff:  Pending (specify):  Consults_________, Tests________, Test Results_______, Other____diuresis_____  Family discussion: d/w pt at bedside re: treatment plan, primary dx  Disposition: Home_x__/SNF___/Other________/Unknown at this time________ 65F PMHx HTN, HLD, DM2, HFpEF, CKD III here with dyspnea on exertion, generalized weakness.    #Generalized weakness, LE edema, dyspnea on exertion due to acute on chronic diastolic heart failure  no hypoxia, satting well on ra  cxr clear  in setting of HFpEF, chronic  lasix 80 iv bid- will switch to lasix 80mg BID and metolazone 2.5mg PO MWF on discharge today  strict i/o  DC planning    #Anemia, likely due to renal disease  s/p epogen and venofer. Will give another dose of venofer today  Pt does not want blood transfusion nor further workup inpatient stating she is always anemic  CBC and BMP to be followed up outpatient within 1 week    #MANISH on CKD III  resolving, suspect some component of cardiorenal  lasix as above  trend  serological w/u per renal    #HTN  atenolol 25    #HLD  lipitor 10    #DM2  lantus 40  humalog 15 tid  ssi    #DVT ppx  subq hep    #Progress Note Handoff  Pending (specify): DC planning  Family discussion: d/w pt regarding anemia and followup outpatient  Disposition: Home

## 2020-07-01 NOTE — PROGRESS NOTE ADULT - SUBJECTIVE AND OBJECTIVE BOX
SAGE REYNOLDS  65y, Female  Allergy: No Known Allergies    Hospital Day: 3d    Patient seen and examined earlier today. No acute events overnight.    PMH/PSH:  PAST MEDICAL & SURGICAL HISTORY:  Hypertension  High blood cholesterol  Diabetes mellitus, type 2  Chronic obstructive pulmonary disease  History of cholecystectomy    VITALS:  T(F): 97.2 (07-01-20 @ 05:33), Max: 98.9 (06-30-20 @ 21:06)  HR: 78 (07-01-20 @ 05:33)  BP: 150/70 (07-01-20 @ 05:33) (150/70 - 158/70)  RR: 18 (07-01-20 @ 05:33)  SpO2: 97% (06-30-20 @ 21:41)    TESTS & MEASUREMENTS:  Weight (Kg):   BMI (kg/m2): 31.9 (06-30)    06-29-20 @ 07:01  -  06-30-20 @ 07:00  --------------------------------------------------------  IN: 760 mL / OUT: 700 mL / NET: 60 mL    06-30-20 @ 07:01  -  07-01-20 @ 07:00  --------------------------------------------------------  IN: 110 mL / OUT: 650 mL / NET: -540 mL                        7.1    6.39  )-----------( 96       ( 01 Jul 2020 04:30 )             23.0     PT/INR - ( 30 Jun 2020 06:41 )   PT: 13.70 sec;   INR: 1.19 ratio      07-01    137  |  103  |  78<HH>  ----------------------------<  93  4.2   |  24  |  2.0<H>    Ca    8.3<L>      01 Jul 2020 04:30  Phos  4.4     07-01  Mg     2.1     07-01    TPro  5.3<L>  /  Alb  3.1<L>  /  TBili  0.4  /  DBili  x   /  AST  14  /  ALT  11  /  AlkPhos  86  07-01    LIVER FUNCTIONS - ( 01 Jul 2020 04:30 )  Alb: 3.1 g/dL / Pro: 5.3 g/dL / ALK PHOS: 86 U/L / ALT: 11 U/L / AST: 14 U/L / GGT: x           GI PCR Panel, Stool (collected 06-30-20 @ 15:53)  Source: .Stool Feces  Final Report (07-01-20 @ 03:27):    GI PCR Results: NOT detected    *******Please Note:*******    GI panel PCR evaluates for:    Campylobacter, Plesiomonas shigelloides, Salmonella,    Vibrio, Yersinia enterocolitica, Enteroaggregative    Escherichia coli (EAEC), Enteropathogenic E.coli (EPEC),    Enterotoxigenic E. coli (ETEC) lt/st, Shiga-like    toxin-producing E. coli (STEC) stx1/stx2,    Shigella/ Enteroinvasive E. coli (EIEC), Cryptosporidium,    Cyclospora cayetanensis, Entamoeba histolytica,    Giardia lamblia, Adenovirus F 40/41, Astrovirus,    Norovirus GI/GII, Rotavirus A, Sapovirus    MEDICATIONS:  MEDICATIONS  (STANDING):  ATENolol  Tablet 25 milliGRAM(s) Oral daily  atorvastatin 10 milliGRAM(s) Oral at bedtime  dextrose 5%. 1000 milliLiter(s) (50 mL/Hr) IV Continuous <Continuous>  dextrose 50% Injectable 12.5 Gram(s) IV Push once  fenofibrate Tablet 145 milliGRAM(s) Oral daily  furosemide   Injectable 80 milliGRAM(s) IV Push every 12 hours  heparin   Injectable 5000 Unit(s) SubCutaneous every 12 hours  insulin glargine Injectable (LANTUS) 20 Unit(s) SubCutaneous at bedtime  insulin lispro (HumaLOG) corrective regimen sliding scale   SubCutaneous three times a day before meals  iron sucrose IVPB 200 milliGRAM(s) IV Intermittent every 24 hours    MEDICATIONS  (PRN):  acetaminophen   Tablet .. 650 milliGRAM(s) Oral every 4 hours PRN Mild Pain (1 - 3)    HOME MEDICATIONS:  atenolol (06-16)  atorvastatin (06-16)  fenofibrate (06-16)  furosemide (06-16)  insulin lispro 100 units/mL injectable solution (obsolete) (06-16)  Lantus 100 units/mL subcutaneous solution (06-16)      REVIEW OF SYSTEMS:  All other review of systems is negative unless indicated above.     PHYSICAL EXAM:  GENERAL: NAD  HEENT: No Swelling  CHEST/LUNG: Good air entry, No wheezing  HEART: RRR, No murmurs  ABDOMEN: Soft, Bowel sounds present  EXTREMITIES:  No clubbing

## 2020-07-01 NOTE — DISCHARGE NOTE PROVIDER - HOSPITAL COURSE
64 yo female with hx of htn, hld, copd not on o2, dm, recently diagnosed chf presenting with 1 week of worsening sob associated with orthopnea, b/l calf swelling, abdominal distension, 10 lb weight gain over 2 weeks, vomiting x2 NBNB episodes yesterday. denies cp, fever, worsening cough, abd pain, diarrhea, hx of clots.         Patient was treated for acute on chronic diastolic heart failure. Diuresed inaptient with strict monitoring of intake and output with nephrology consult on board. Patient was found to be anemic throughout admission, likely chronic due to her history of CKD. There were no signs of active bleeding. She refused blood transfusion and further workup inpatient. Patient clinically improved and is medically stable for discharge. She will follow up with her PMD and nephrologist outpatient.

## 2020-07-01 NOTE — PROGRESS NOTE ADULT - ASSESSMENT
chronic kidney disease stage 4    - non-nephrotic proteinuria   - increased b/l renal echogenicity on renal sono   - Cr stable with diuretics  peripheral edema  anemia from MARIPOSA and CKD / thrombocytopenia   acute on chronic CHF - echo with EF 55% and moderate LVH  COPD / active smoker / chronic exertional dyspnea  secondary hyperparathyroidism  DM2 on insulin  left AMA last week as pt moved to [place]  intermittent, chronic hematochezia from hemorrhoids     plan:    pt adamantly refusing PRBC transfusion, insistent on going home today.  Does not want further inpatient w/u of anemia (i.e. GI eval)  epogen 20,000U SQ x 1 given  venofer 200mg IV given  change to lasix 80mg po BID and metolazone 2.5mg po TIW upon discharge  oupt renal f/u and lab check in 1 week  d/w Dr. Cali

## 2020-07-01 NOTE — DISCHARGE NOTE PROVIDER - PROVIDER TOKENS
FREE:[LAST:[PCP],PHONE:[(   )    -],FAX:[(   )    -],FOLLOWUP:[1 week]],PROVIDER:[TOKEN:[[id number]],FOLLOWUP:[1 week]]

## 2020-07-01 NOTE — DISCHARGE NOTE NURSING/CASE MANAGEMENT/SOCIAL WORK - PATIENT PORTAL LINK FT
You can access the FollowMyHealth Patient Portal offered by [facility] by registering at the following website: [email or web address]. By joining [facility]’s FollowMyHealth portal, you will also be able to view your health information using other applications (apps) compatible with our system.

## 2020-07-05 DIAGNOSIS — I50.9 HEART FAILURE, UNSPECIFIED: ICD-10-CM

## 2020-07-05 DIAGNOSIS — N25.81 SECONDARY HYPERPARATHYROIDISM OF RENAL ORIGIN: ICD-10-CM

## 2020-07-05 DIAGNOSIS — D63.1 ANEMIA IN CHRONIC KIDNEY DISEASE: ICD-10-CM

## 2020-07-05 DIAGNOSIS — K64.9 UNSPECIFIED HEMORRHOIDS: ICD-10-CM

## 2020-07-05 DIAGNOSIS — I13.0 HYPERTENSIVE HEART AND CHRONIC KIDNEY DISEASE WITH HEART FAILURE AND STAGE 1 THROUGH STAGE 4 CHRONIC KIDNEY DISEASE, OR UNSPECIFIED CHRONIC KIDNEY DISEASE: ICD-10-CM

## 2020-07-05 DIAGNOSIS — I50.33 ACUTE ON CHRONIC DIASTOLIC (CONGESTIVE) HEART FAILURE: ICD-10-CM

## 2020-07-05 DIAGNOSIS — N18.4 CHRONIC KIDNEY DISEASE, STAGE 4 (SEVERE): ICD-10-CM

## 2020-07-05 DIAGNOSIS — Z53.20 PROCEDURE AND TREATMENT NOT CARRIED OUT BECAUSE OF PATIENT'S DECISION FOR UNSPECIFIED REASONS: ICD-10-CM

## 2020-07-05 DIAGNOSIS — D50.9 IRON DEFICIENCY ANEMIA, UNSPECIFIED: ICD-10-CM

## 2020-07-05 DIAGNOSIS — Z79.4 LONG TERM (CURRENT) USE OF INSULIN: ICD-10-CM

## 2020-07-05 DIAGNOSIS — J44.9 CHRONIC OBSTRUCTIVE PULMONARY DISEASE, UNSPECIFIED: ICD-10-CM

## 2020-07-05 DIAGNOSIS — Z90.49 ACQUIRED ABSENCE OF OTHER SPECIFIED PARTS OF DIGESTIVE TRACT: ICD-10-CM

## 2020-07-05 DIAGNOSIS — F17.210 NICOTINE DEPENDENCE, CIGARETTES, UNCOMPLICATED: ICD-10-CM

## 2020-07-05 DIAGNOSIS — N17.9 ACUTE KIDNEY FAILURE, UNSPECIFIED: ICD-10-CM

## 2020-07-05 DIAGNOSIS — E11.22 TYPE 2 DIABETES MELLITUS WITH DIABETIC CHRONIC KIDNEY DISEASE: ICD-10-CM

## 2020-07-05 DIAGNOSIS — E78.5 HYPERLIPIDEMIA, UNSPECIFIED: ICD-10-CM

## 2020-07-05 DIAGNOSIS — R79.89 OTHER SPECIFIED ABNORMAL FINDINGS OF BLOOD CHEMISTRY: ICD-10-CM

## 2020-07-05 DIAGNOSIS — D69.6 THROMBOCYTOPENIA, UNSPECIFIED: ICD-10-CM

## 2021-02-02 NOTE — DIETITIAN INITIAL EVALUATION ADULT. - WEIGHT CHANGE
-hold home dose of Lisinopril in setting of RUTH Uncontrolled as evidenced by A1c 8.0  Pt takes glipizide and metformin at home   Start Lispro 2U preprandial standing dose  Continue with Lantus coverage QHS   Continue with sliding scale insulin coverage  Continue with glucose monitoring  Continue with diabetic diet Uncontrolled as evidenced by A1c 8.0  Pt takes glipizide and metformin at home   Continue with Lantus coverage QHS   Continue with sliding scale insulin coverage  Continue with glucose monitoring  Continue with diabetic diet yes

## 2021-07-14 NOTE — ED ADULT TRIAGE NOTE - WEIGHT IN KG
Bedside shift change report given to Leesa Chew RN (oncoming nurse) by Kvng Rogers (offgoing nurse). Report included the following information SBAR, Kardex, Procedure Summary, Intake/Output, MAR and Recent Results. 81.6

## 2023-11-14 NOTE — ED ADULT TRIAGE NOTE - PATIENT ON (OXYGEN DELIVERY METHOD)
Quality 431: Preventive Care And Screening: Unhealthy Alcohol Use - Screening: Patient not identified as an unhealthy alcohol user when screened for unhealthy alcohol use using a systematic screening method Quality 110: Preventive Care And Screening: Influenza Immunization: Influenza Immunization Administered during Influenza season Quality 154 Part B: Falls: Risk Screening (Should Be Reported With Measure 155.): Patient screened for future fall risk; documentation of no falls in the past year or only one fall without injury in the past year Quality 47: Advance Care Plan: Advance Care Planning discussed and documented; advance care plan or surrogate decision maker documented in the medical record. Quality 402: Tobacco Use And Help With Quitting Among Adolescents: Patient screened for tobacco and never smoked Detail Level: Detailed Quality 226: Preventive Care And Screening: Tobacco Use: Screening And Cessation Intervention: Patient screened for tobacco use and is an ex/non-smoker Quality 111:Pneumonia Vaccination Status For Older Adults: Patient did not receive any pneumococcal conjugate or polysaccharide vaccine on or after their 60th birthday and before the end of the measurement period Quality 130: Documentation Of Current Medications In The Medical Record: Current Medications Documented Quality 154 Part A: Falls: Risk Assessment (Should Be Reported With Measure 155.): Falls risk assessment completed and documented in the past 12 months. room air

## 2024-01-04 NOTE — H&P ADULT - NSHPLANGLIMITEDENGLISH_GEN_A_CORE
No [FreeTextEntry1] : Patient had normal mammogram and bone density consistent with osteoporosis discussed treatment options patient likely to start Actonel also discussed her possible findings of rheumatoid arthritis she will follow-up with a rheumatologist regarding continued workup and possible treatment Pap smear performed